# Patient Record
Sex: FEMALE | Race: OTHER | NOT HISPANIC OR LATINO
[De-identification: names, ages, dates, MRNs, and addresses within clinical notes are randomized per-mention and may not be internally consistent; named-entity substitution may affect disease eponyms.]

---

## 2017-07-07 ENCOUNTER — APPOINTMENT (OUTPATIENT)
Dept: BREAST CENTER | Facility: CLINIC | Age: 37
End: 2017-07-07
Payer: COMMERCIAL

## 2017-07-07 VITALS
HEART RATE: 61 BPM | HEIGHT: 62 IN | RESPIRATION RATE: 16 BRPM | TEMPERATURE: 97.5 F | BODY MASS INDEX: 23 KG/M2 | SYSTOLIC BLOOD PRESSURE: 125 MMHG | WEIGHT: 125 LBS | DIASTOLIC BLOOD PRESSURE: 78 MMHG

## 2017-07-07 DIAGNOSIS — F15.90 OTHER STIMULANT USE, UNSPECIFIED, UNCOMPLICATED: ICD-10-CM

## 2017-07-07 PROBLEM — Z00.00 ENCOUNTER FOR PREVENTIVE HEALTH EXAMINATION: Status: ACTIVE | Noted: 2017-07-07

## 2017-07-07 PROCEDURE — 99205 OFFICE O/P NEW HI 60 MIN: CPT

## 2017-07-10 ENCOUNTER — APPOINTMENT (OUTPATIENT)
Dept: PLASTIC SURGERY | Facility: CLINIC | Age: 37
End: 2017-07-10
Payer: COMMERCIAL

## 2017-07-10 DIAGNOSIS — M17.10 UNILATERAL PRIMARY OSTEOARTHRITIS, UNSPECIFIED KNEE: ICD-10-CM

## 2017-07-10 PROCEDURE — 99205 OFFICE O/P NEW HI 60 MIN: CPT

## 2017-07-13 ENCOUNTER — FORM ENCOUNTER (OUTPATIENT)
Age: 37
End: 2017-07-13

## 2017-07-13 DIAGNOSIS — R92.8 OTHER ABNORMAL AND INCONCLUSIVE FINDINGS ON DIAGNOSTIC IMAGING OF BREAST: ICD-10-CM

## 2017-07-14 ENCOUNTER — OUTPATIENT (OUTPATIENT)
Dept: OUTPATIENT SERVICES | Facility: HOSPITAL | Age: 37
LOS: 1 days | End: 2017-07-14
Payer: COMMERCIAL

## 2017-07-14 PROCEDURE — 77059 MRI BREAST BILATERAL: CPT | Mod: 26

## 2017-07-14 PROCEDURE — 0159T: CPT | Mod: 26

## 2017-07-17 PROBLEM — R92.8 ABNORMAL FINDING ON RADIOLOGICAL EXAMINATION OF BREAST: Status: ACTIVE | Noted: 2017-07-17

## 2017-07-18 ENCOUNTER — RESULT REVIEW (OUTPATIENT)
Age: 37
End: 2017-07-18

## 2017-07-18 PROCEDURE — C8937: CPT

## 2017-07-18 PROCEDURE — 88321 CONSLTJ&REPRT SLD PREP ELSWR: CPT

## 2017-07-18 PROCEDURE — A9585: CPT

## 2017-07-18 PROCEDURE — 77049 MRI BREAST C-+ W/CAD BI: CPT

## 2017-07-19 LAB — SURGICAL PATHOLOGY STUDY: SIGNIFICANT CHANGE UP

## 2017-07-23 ENCOUNTER — FORM ENCOUNTER (OUTPATIENT)
Age: 37
End: 2017-07-23

## 2017-07-24 ENCOUNTER — OUTPATIENT (OUTPATIENT)
Dept: OUTPATIENT SERVICES | Facility: HOSPITAL | Age: 37
LOS: 1 days | End: 2017-07-24
Payer: COMMERCIAL

## 2017-07-24 ENCOUNTER — FORM ENCOUNTER (OUTPATIENT)
Age: 37
End: 2017-07-24

## 2017-07-24 PROCEDURE — 73706 CT ANGIO LWR EXTR W/O&W/DYE: CPT

## 2017-07-24 PROCEDURE — 75635 CT ANGIO ABDOMINAL ARTERIES: CPT | Mod: 26

## 2017-07-24 PROCEDURE — 74174 CTA ABD&PLVS W/CONTRAST: CPT

## 2017-07-25 ENCOUNTER — RESULT REVIEW (OUTPATIENT)
Age: 37
End: 2017-07-25

## 2017-07-25 ENCOUNTER — OUTPATIENT (OUTPATIENT)
Dept: OUTPATIENT SERVICES | Facility: HOSPITAL | Age: 37
LOS: 1 days | End: 2017-07-25
Payer: COMMERCIAL

## 2017-07-25 PROCEDURE — 76642 ULTRASOUND BREAST LIMITED: CPT | Mod: 26,RT

## 2017-07-25 PROCEDURE — 76642 ULTRASOUND BREAST LIMITED: CPT

## 2017-07-27 ENCOUNTER — FORM ENCOUNTER (OUTPATIENT)
Age: 37
End: 2017-07-27

## 2017-07-28 ENCOUNTER — OUTPATIENT (OUTPATIENT)
Dept: OUTPATIENT SERVICES | Facility: HOSPITAL | Age: 37
LOS: 1 days | End: 2017-07-28
Payer: COMMERCIAL

## 2017-07-28 ENCOUNTER — RESULT REVIEW (OUTPATIENT)
Age: 37
End: 2017-07-28

## 2017-07-28 PROCEDURE — 77065 DX MAMMO INCL CAD UNI: CPT

## 2017-07-28 PROCEDURE — A4648: CPT

## 2017-07-28 PROCEDURE — 19085 BX BREAST 1ST LESION MR IMAG: CPT | Mod: RT

## 2017-07-28 PROCEDURE — G0206: CPT | Mod: 26,RT

## 2017-07-28 PROCEDURE — A9585: CPT

## 2017-07-28 PROCEDURE — 19085 BX BREAST 1ST LESION MR IMAG: CPT

## 2017-07-28 PROCEDURE — 88305 TISSUE EXAM BY PATHOLOGIST: CPT

## 2017-07-31 ENCOUNTER — FORM ENCOUNTER (OUTPATIENT)
Age: 37
End: 2017-07-31

## 2017-07-31 LAB — SURGICAL PATHOLOGY STUDY: SIGNIFICANT CHANGE UP

## 2017-08-01 ENCOUNTER — OUTPATIENT (OUTPATIENT)
Dept: OUTPATIENT SERVICES | Facility: HOSPITAL | Age: 37
LOS: 1 days | End: 2017-08-01
Payer: COMMERCIAL

## 2017-08-01 ENCOUNTER — OTHER (OUTPATIENT)
Age: 37
End: 2017-08-01

## 2017-08-01 ENCOUNTER — RESULT REVIEW (OUTPATIENT)
Age: 37
End: 2017-08-01

## 2017-08-01 VITALS
WEIGHT: 119.71 LBS | SYSTOLIC BLOOD PRESSURE: 107 MMHG | DIASTOLIC BLOOD PRESSURE: 69 MMHG | RESPIRATION RATE: 16 BRPM | TEMPERATURE: 98 F | OXYGEN SATURATION: 100 % | HEART RATE: 76 BPM | HEIGHT: 62 IN

## 2017-08-01 PROCEDURE — 71020: CPT | Mod: 26

## 2017-08-01 PROCEDURE — 78195 LYMPH SYSTEM IMAGING: CPT | Mod: 26

## 2017-08-01 NOTE — PRE-OP CHECKLIST - BSA (M2)
SUBJECTIVE:      Mallory Sargent is a 49 year old female who presents to clinic today for the following health issues:        Patient is here for a referral for a sleep study and would like a referral for genetic testing   Wakes up from snoring , feels tired during the day patient suspect that she has sleep apnea      Sleep Study Referral Request:  She has snored for most of her adult life. She estimates at least 12-15 years.    She wakes herself up at night at the end of a snore.   Unsure if she stops breathing during the night.   Tired all the time during the day. This is her big motivating factor for the sleep study.      Genetic Testing Referral Request:  Mom  of ovarian cancer.   - 57 at diagnosis  - 62 when passed.   Mother's aunt also had ovarian cancer.   No family history of breast cancer.   Would like to get tested for BRCA1/2.     Mother's first cousin had colon cancer.   She goes in every five years for a colonoscopy.    No family history of other cancers, including pancreatic, melanoma, or endometrial cancer.             Problem list and histories reviewed & adjusted, as indicated.  Additional history: as documented     Patient Active Problem List   Diagnosis     Family history of malignant neoplasm of ovary     Benign shuddering attack     Esophageal reflux     Acute reaction to stress     RECURR Depressive disorder - MOD     Hyperlipidemia LDL goal <130     Obesity     Papanicolaou smear of cervix with atypical squamous cells of undetermined significance (ASC-US)     Past Surgical History:   Procedure Laterality Date     C LIGATE FALLOPIAN TUBE       C NONSPECIFIC PROCEDURE      vaginal repair after delivery     C/SECTION, LOW TRANSVERSE      , Low Transverse     CHOLECYSTECTOMY, LAPOROSCOPIC  2010    Cholecystectomy, Laparoscopic     COLONOSCOPY  2/15/2016    Dr. Marc SILVA     HERNIA REPAIR, INCISIONAL  2010    Laparoscopic       Social History   Substance Use Topics      "Smoking status: Former Smoker     Quit date: 5/30/1993     Smokeless tobacco: Never Used     Alcohol use Yes      Comment: very rarely      Family History   Problem Relation Age of Onset     Hypertension Father      Lipids Father      GASTROINTESTINAL DISEASE Father      vazquez's esophagus fr. reflux      CANCER Mother      ovarian     Arthritis Mother      lupus      Depression Paternal Grandmother      problems with schizophrenia     Arthritis Brother      GASTROINTESTINAL DISEASE Brother      reflux      Colon Cancer Cousin      Ovarian Cancer Other      Maternal great aunt         Current Outpatient Prescriptions   Medication Sig Dispense Refill     omeprazole (PRILOSEC) 20 MG CR capsule TAKE 1 CAPSULE BY MOUTH DAILY, 30 TO 60 MINUTES BEFORE A MEAL. 90 capsule 3     simvastatin (ZOCOR) 40 MG tablet Take 1 tablet (40 mg) by mouth At Bedtime 90 tablet 1     multivitamin, therapeutic with minerals (MULTI-VITAMIN) TABS tablet Take 1 tablet by mouth daily       traZODone (DESYREL) 50 MG tablet Take 1 tablet (50 mg) by mouth nightly as needed for sleep 30 tablet 1     zolpidem (AMBIEN) 5 MG tablet Take 1 tablet (5 mg) by mouth nightly as needed for sleep 30 tablet 5     aspirin 81 MG tablet Take by mouth daily 30 tablet      No Known Allergies     Reviewed and updated as needed this visit by clinical staff        Reviewed and updated as needed this visit by Provider           ROS:  C: NEGATIVE for fever, chills, change in weight  INTEGUMENTARY/SKIN: NEGATIVE for worrisome rashes, moles or lesions    OBJECTIVE:     /66  Pulse 71  Temp 98.5  F (36.9  C) (Oral)  Ht 5' 10.5\" (1.791 m)  Wt 228 lb (103.4 kg)  LMP 06/09/2015  SpO2 98%  BMI 32.25 kg/m2  Body mass index is 32.25 kg/(m^2).  GENERAL: healthy, alert and no distress    Diagnostic Test Results:  none     ASSESSMENT/PLAN:   1. Snoring  Patient has a history of snoring and daytime drowsiness. Referral sent for sleep evaluation to assess for sleep apnea. "     - SLEEP EVALUATION & MANAGEMENT REFERRAL - ADULT; Future    2. Family history of malignant neoplasm of ovary  Maternal family history of ovarian cancer. She is concerned about her risk for cancer. Referral sent for genetic counseling and cancer risk management to assess for genetic risk of ovarian cancer.     - CANCER RISK MGMT/CANCER GENETIC COUNSELING REFERRAL      Fawad MUNOZ     1.54

## 2017-08-01 NOTE — PATIENT PROFILE ADULT. - TEACHING/LEARNING LEARNING PREFERENCES
verbal instruction/written material/individual instruction skill demonstration/verbal instruction/written material/individual instruction

## 2017-08-02 ENCOUNTER — INPATIENT (INPATIENT)
Facility: HOSPITAL | Age: 37
LOS: 2 days | Discharge: HOME HEALTH | DRG: 572 | End: 2017-08-05
Attending: PLASTIC SURGERY | Admitting: PLASTIC SURGERY
Payer: COMMERCIAL

## 2017-08-02 ENCOUNTER — RESULT REVIEW (OUTPATIENT)
Age: 37
End: 2017-08-02

## 2017-08-02 DIAGNOSIS — R19.8 OTHER SPECIFIED SYMPTOMS AND SIGNS INVOLVING THE DIGESTIVE SYSTEM AND ABDOMEN: Chronic | ICD-10-CM

## 2017-08-02 LAB
BASE EXCESS BLDA CALC-SCNC: -3.6 MMOL/L — LOW (ref -2–3)
CA-I BLDA-SCNC: 1.07 MMOL/L — LOW (ref 1.12–1.3)
COHGB MFR BLDA: 0.3 % — SIGNIFICANT CHANGE UP
GAS PNL BLDA: SIGNIFICANT CHANGE UP
HCO3 BLDA-SCNC: 20 MMOL/L — LOW (ref 21–28)
HGB BLDA-MCNC: 12 G/DL — SIGNIFICANT CHANGE UP (ref 11.5–15.5)
METHGB MFR BLDA: 0.3 % — SIGNIFICANT CHANGE UP
O2 CT VFR BLDA CALC: SIGNIFICANT CHANGE UP (ref 15–23)
OXYHGB MFR BLDA: 99 % — SIGNIFICANT CHANGE UP (ref 94–100)
PCO2 BLDA: 34 MMHG — SIGNIFICANT CHANGE UP (ref 32–45)
PH BLDA: 7.4 — SIGNIFICANT CHANGE UP (ref 7.35–7.45)
PO2 BLDA: 311 MMHG — HIGH (ref 83–108)
POTASSIUM BLDA-SCNC: 3.5 MMOL/L — SIGNIFICANT CHANGE UP (ref 3.5–4.9)
SAO2 % BLDA: 100 % — SIGNIFICANT CHANGE UP (ref 95–100)
SODIUM BLDA-SCNC: 135 MMOL/L — LOW (ref 138–146)

## 2017-08-02 PROCEDURE — 78195 LYMPH SYSTEM IMAGING: CPT

## 2017-08-02 PROCEDURE — S2066: CPT | Mod: LT

## 2017-08-02 PROCEDURE — 38525 BIOPSY/REMOVAL LYMPH NODES: CPT | Mod: LT

## 2017-08-02 PROCEDURE — 38530 BIOPSY/REMOVAL LYMPH NODES: CPT | Mod: 50,59

## 2017-08-02 PROCEDURE — S2066: CPT | Mod: 80,RT

## 2017-08-02 PROCEDURE — 71046 X-RAY EXAM CHEST 2 VIEWS: CPT

## 2017-08-02 PROCEDURE — 19303 MAST SIMPLE COMPLETE: CPT | Mod: 50

## 2017-08-02 PROCEDURE — 38900 IO MAP OF SENT LYMPH NODE: CPT | Mod: LT

## 2017-08-02 PROCEDURE — A9541: CPT

## 2017-08-02 RX ORDER — DOCUSATE SODIUM 100 MG
100 CAPSULE ORAL THREE TIMES A DAY
Qty: 0 | Refills: 0 | Status: DISCONTINUED | OUTPATIENT
Start: 2017-08-02 | End: 2017-08-05

## 2017-08-02 RX ORDER — SODIUM CHLORIDE 9 MG/ML
1000 INJECTION, SOLUTION INTRAVENOUS
Qty: 0 | Refills: 0 | Status: DISCONTINUED | OUTPATIENT
Start: 2017-08-02 | End: 2017-08-03

## 2017-08-02 RX ORDER — ENOXAPARIN SODIUM 100 MG/ML
40 INJECTION SUBCUTANEOUS EVERY 24 HOURS
Qty: 0 | Refills: 0 | Status: DISCONTINUED | OUTPATIENT
Start: 2017-08-03 | End: 2017-08-05

## 2017-08-02 RX ORDER — BUPIVACAINE 13.3 MG/ML
20 INJECTION, SUSPENSION, LIPOSOMAL INFILTRATION ONCE
Qty: 0 | Refills: 0 | Status: DISCONTINUED | OUTPATIENT
Start: 2017-08-02 | End: 2017-08-05

## 2017-08-02 RX ORDER — KETOROLAC TROMETHAMINE 30 MG/ML
30 SYRINGE (ML) INJECTION EVERY 6 HOURS
Qty: 0 | Refills: 0 | Status: COMPLETED | OUTPATIENT
Start: 2017-08-02 | End: 2017-08-05

## 2017-08-02 RX ORDER — METOCLOPRAMIDE HCL 10 MG
10 TABLET ORAL EVERY 6 HOURS
Qty: 0 | Refills: 0 | Status: DISCONTINUED | OUTPATIENT
Start: 2017-08-02 | End: 2017-08-05

## 2017-08-02 RX ORDER — ACETAMINOPHEN 500 MG
975 TABLET ORAL EVERY 8 HOURS
Qty: 0 | Refills: 0 | Status: DISCONTINUED | OUTPATIENT
Start: 2017-08-02 | End: 2017-08-05

## 2017-08-02 RX ORDER — OXYCODONE HYDROCHLORIDE 5 MG/1
10 TABLET ORAL EVERY 4 HOURS
Qty: 0 | Refills: 0 | Status: DISCONTINUED | OUTPATIENT
Start: 2017-08-02 | End: 2017-08-05

## 2017-08-02 RX ORDER — OXYCODONE HYDROCHLORIDE 5 MG/1
5 TABLET ORAL EVERY 4 HOURS
Qty: 0 | Refills: 0 | Status: DISCONTINUED | OUTPATIENT
Start: 2017-08-02 | End: 2017-08-05

## 2017-08-02 RX ORDER — SENNA PLUS 8.6 MG/1
2 TABLET ORAL AT BEDTIME
Qty: 0 | Refills: 0 | Status: DISCONTINUED | OUTPATIENT
Start: 2017-08-02 | End: 2017-08-05

## 2017-08-02 RX ORDER — ENOXAPARIN SODIUM 100 MG/ML
40 INJECTION SUBCUTANEOUS ONCE
Qty: 0 | Refills: 0 | Status: COMPLETED | OUTPATIENT
Start: 2017-08-02 | End: 2017-08-02

## 2017-08-02 RX ORDER — HYDROMORPHONE HYDROCHLORIDE 2 MG/ML
0.5 INJECTION INTRAMUSCULAR; INTRAVENOUS; SUBCUTANEOUS
Qty: 0 | Refills: 0 | Status: DISCONTINUED | OUTPATIENT
Start: 2017-08-02 | End: 2017-08-05

## 2017-08-02 RX ORDER — ONDANSETRON 8 MG/1
4 TABLET, FILM COATED ORAL EVERY 6 HOURS
Qty: 0 | Refills: 0 | Status: DISCONTINUED | OUTPATIENT
Start: 2017-08-02 | End: 2017-08-05

## 2017-08-02 RX ORDER — CEFAZOLIN SODIUM 1 G
2000 VIAL (EA) INJECTION EVERY 8 HOURS
Qty: 0 | Refills: 0 | Status: COMPLETED | OUTPATIENT
Start: 2017-08-02 | End: 2017-08-03

## 2017-08-02 RX ADMIN — HYDROMORPHONE HYDROCHLORIDE 0.5 MILLIGRAM(S): 2 INJECTION INTRAMUSCULAR; INTRAVENOUS; SUBCUTANEOUS at 21:14

## 2017-08-02 RX ADMIN — Medication 10 MILLIGRAM(S): at 23:40

## 2017-08-02 RX ADMIN — ENOXAPARIN SODIUM 40 MILLIGRAM(S): 100 INJECTION SUBCUTANEOUS at 07:46

## 2017-08-02 RX ADMIN — ONDANSETRON 4 MILLIGRAM(S): 8 TABLET, FILM COATED ORAL at 20:45

## 2017-08-02 RX ADMIN — HYDROMORPHONE HYDROCHLORIDE 0.5 MILLIGRAM(S): 2 INJECTION INTRAMUSCULAR; INTRAVENOUS; SUBCUTANEOUS at 20:45

## 2017-08-03 ENCOUNTER — TRANSCRIPTION ENCOUNTER (OUTPATIENT)
Age: 37
End: 2017-08-03

## 2017-08-03 LAB
ANION GAP SERPL CALC-SCNC: 12 MMOL/L — SIGNIFICANT CHANGE UP (ref 5–17)
BUN SERPL-MCNC: 9 MG/DL — SIGNIFICANT CHANGE UP (ref 7–23)
CALCIUM SERPL-MCNC: 8.2 MG/DL — LOW (ref 8.4–10.5)
CHLORIDE SERPL-SCNC: 103 MMOL/L — SIGNIFICANT CHANGE UP (ref 96–108)
CO2 SERPL-SCNC: 22 MMOL/L — SIGNIFICANT CHANGE UP (ref 22–31)
CREAT SERPL-MCNC: 0.5 MG/DL — SIGNIFICANT CHANGE UP (ref 0.5–1.3)
GLUCOSE SERPL-MCNC: 131 MG/DL — HIGH (ref 70–99)
HCT VFR BLD CALC: 33.3 % — LOW (ref 34.5–45)
HGB BLD-MCNC: 10.9 G/DL — LOW (ref 11.5–15.5)
MCHC RBC-ENTMCNC: 30.8 PG — SIGNIFICANT CHANGE UP (ref 27–34)
MCHC RBC-ENTMCNC: 32.7 G/DL — SIGNIFICANT CHANGE UP (ref 32–36)
MCV RBC AUTO: 94.1 FL — SIGNIFICANT CHANGE UP (ref 80–100)
PLATELET # BLD AUTO: 247 K/UL — SIGNIFICANT CHANGE UP (ref 150–400)
POTASSIUM SERPL-MCNC: 4.4 MMOL/L — SIGNIFICANT CHANGE UP (ref 3.5–5.3)
POTASSIUM SERPL-SCNC: 4.4 MMOL/L — SIGNIFICANT CHANGE UP (ref 3.5–5.3)
RBC # BLD: 3.54 M/UL — LOW (ref 3.8–5.2)
RBC # FLD: 11.9 % — SIGNIFICANT CHANGE UP (ref 10.3–16.9)
SODIUM SERPL-SCNC: 137 MMOL/L — SIGNIFICANT CHANGE UP (ref 135–145)
WBC # BLD: 16.4 K/UL — HIGH (ref 3.8–10.5)
WBC # FLD AUTO: 16.4 K/UL — HIGH (ref 3.8–10.5)

## 2017-08-03 RX ORDER — SODIUM CHLORIDE 9 MG/ML
1000 INJECTION, SOLUTION INTRAVENOUS
Qty: 0 | Refills: 0 | Status: DISCONTINUED | OUTPATIENT
Start: 2017-08-03 | End: 2017-08-05

## 2017-08-03 RX ADMIN — Medication 975 MILLIGRAM(S): at 07:46

## 2017-08-03 RX ADMIN — Medication 30 MILLIGRAM(S): at 17:04

## 2017-08-03 RX ADMIN — ONDANSETRON 4 MILLIGRAM(S): 8 TABLET, FILM COATED ORAL at 10:25

## 2017-08-03 RX ADMIN — Medication 30 MILLIGRAM(S): at 13:31

## 2017-08-03 RX ADMIN — Medication 100 MILLIGRAM(S): at 09:26

## 2017-08-03 RX ADMIN — Medication 100 MILLIGRAM(S): at 06:08

## 2017-08-03 RX ADMIN — Medication 100 MILLIGRAM(S): at 21:46

## 2017-08-03 RX ADMIN — Medication 30 MILLIGRAM(S): at 01:34

## 2017-08-03 RX ADMIN — OXYCODONE HYDROCHLORIDE 5 MILLIGRAM(S): 5 TABLET ORAL at 10:10

## 2017-08-03 RX ADMIN — OXYCODONE HYDROCHLORIDE 5 MILLIGRAM(S): 5 TABLET ORAL at 17:05

## 2017-08-03 RX ADMIN — Medication 975 MILLIGRAM(S): at 22:46

## 2017-08-03 RX ADMIN — Medication 30 MILLIGRAM(S): at 17:35

## 2017-08-03 RX ADMIN — Medication 975 MILLIGRAM(S): at 14:27

## 2017-08-03 RX ADMIN — Medication 30 MILLIGRAM(S): at 07:46

## 2017-08-03 RX ADMIN — OXYCODONE HYDROCHLORIDE 5 MILLIGRAM(S): 5 TABLET ORAL at 17:50

## 2017-08-03 RX ADMIN — OXYCODONE HYDROCHLORIDE 10 MILLIGRAM(S): 5 TABLET ORAL at 20:56

## 2017-08-03 RX ADMIN — Medication 975 MILLIGRAM(S): at 15:05

## 2017-08-03 RX ADMIN — SODIUM CHLORIDE 100 MILLILITER(S): 9 INJECTION, SOLUTION INTRAVENOUS at 09:00

## 2017-08-03 RX ADMIN — Medication 100 MILLIGRAM(S): at 01:30

## 2017-08-03 RX ADMIN — Medication 30 MILLIGRAM(S): at 12:49

## 2017-08-03 RX ADMIN — Medication 975 MILLIGRAM(S): at 06:08

## 2017-08-03 RX ADMIN — Medication 30 MILLIGRAM(S): at 00:47

## 2017-08-03 RX ADMIN — Medication 30 MILLIGRAM(S): at 06:07

## 2017-08-03 RX ADMIN — OXYCODONE HYDROCHLORIDE 5 MILLIGRAM(S): 5 TABLET ORAL at 09:26

## 2017-08-03 RX ADMIN — Medication 100 MILLIGRAM(S): at 14:27

## 2017-08-03 RX ADMIN — ENOXAPARIN SODIUM 40 MILLIGRAM(S): 100 INJECTION SUBCUTANEOUS at 12:49

## 2017-08-03 RX ADMIN — Medication 975 MILLIGRAM(S): at 21:46

## 2017-08-03 NOTE — DISCHARGE NOTE ADULT - PATIENT PORTAL LINK FT
“You can access the Follow[employer] Patient Portal, offered by [facility], by registering with the following website: [email or web address]”

## 2017-08-03 NOTE — PHYSICAL THERAPY INITIAL EVALUATION ADULT - ACTIVE RANGE OF MOTION EXAMINATION, REHAB EVAL
B UE within functional limits for mobility; shoulder motion not assessed above ~70 degrees of shoulder flexion. Hands/wrist/elbows WNL. Bilateral LE motion within functional limits: knee/ankle within normal limits, hips not flexed past 45 degrees as per surgical precautions.

## 2017-08-03 NOTE — DISCHARGE NOTE ADULT - ADDITIONAL INSTRUCTIONS
-Call Doctor’s office or return to ER if: fever (temperature >101.4F), chills, chest pain, shortness of breath, uncontrolled/severe pain, persistent nausea/vomiting, or bleeding/oozing/redness/swelling at incision sites.  	  -For routine questions, call the office ([phone]) weekdays 9:00 A.M. - 5:00 P.M.  For emergencies after business hours, call any time using this same office phone number and the answering service will put you in touch with Dr. Lerman.

## 2017-08-03 NOTE — DISCHARGE NOTE ADULT - CARE PLAN
Principal Discharge DX:	Breast tumor  Goal:	post op recovery and follow up  Instructions for follow-up, activity and diet:	*Please refer to the post-operative care instruction provided from Dr. Lerman’s office.    -Follow up with Plastic & Reconstructive Surgeon, Dr. Lerman in 1 week in the office.   -Follow up with Breast Surgeon, Dr. Ricketts in 1-2 weeks in the office.    -Continue AMADO drain care as instructed. (Empty and record the AMADO drainage twice daily after discharge. Also, strip/milk the drain tubing each time to minimize clogging. Bring the recorded drain amounts to the office so that it can be reviewed by the physician.)     -Take Aspirin 325mg once daily for 10days.   -Take Percocet & Valium as prescribed for pain control.     -Diet: no restrictions.     -Showers are permitted the day of discharge. The drains and the incision lines can get wet in the shower. Do not take a bath. Pin the drains to a bathrobe belt or string or a small towel draped over your neck in order that the drains do not dangle from your skin while in the shower. Keep incision sites and AMADO drain sites clean & dry after showering.     -No heavy lifting >20 pounds or strenuous exercises.

## 2017-08-03 NOTE — DISCHARGE NOTE ADULT - INSTRUCTIONS
The patient may resume a regular diet. Report any fever, swelling, or pain,shortness of breath, or any unusual symptoms.

## 2017-08-03 NOTE — DISCHARGE NOTE ADULT - PLAN OF CARE
post op recovery and follow up *Please refer to the post-operative care instruction provided from Dr. Lerman’s office.    -Follow up with Plastic & Reconstructive Surgeon, Dr. Lerman in 1 week in the office.   -Follow up with Breast Surgeon, Dr. Ricketts in 1-2 weeks in the office.    -Continue AMADO drain care as instructed. (Empty and record the AMADO drainage twice daily after discharge. Also, strip/milk the drain tubing each time to minimize clogging. Bring the recorded drain amounts to the office so that it can be reviewed by the physician.)     -Take Aspirin 325mg once daily for 10days.   -Take Percocet & Valium as prescribed for pain control.     -Diet: no restrictions.     -Showers are permitted the day of discharge. The drains and the incision lines can get wet in the shower. Do not take a bath. Pin the drains to a bathrobe belt or string or a small towel draped over your neck in order that the drains do not dangle from your skin while in the shower. Keep incision sites and AMADO drain sites clean & dry after showering.     -No heavy lifting >20 pounds or strenuous exercises.

## 2017-08-03 NOTE — DISCHARGE NOTE ADULT - MEDICATION SUMMARY - MEDICATIONS TO TAKE
I will START or STAY ON the medications listed below when I get home from the hospital:    Percocet 5/325 325 mg-5 mg oral tablet  -- 1-2 tab(s) by mouth every 4 hours, As Needed -for moderate pain MDD:10  -- Caution federal law prohibits the transfer of this drug to any person other  than the person for whom it was prescribed.  May cause drowsiness.  Alcohol may intensify this effect.  Use care when operating dangerous machinery.  This prescription cannot be refilled.  This product contains acetaminophen.  Do not use  with any other product containing acetaminophen to prevent possible liver damage.  Using more of this medication than prescribed may cause serious breathing problems.    -- Indication: For Postoperative Pain    aspirin 325 mg oral tablet  -- 1 tab(s) by mouth once a day  -- Take with food or milk.    -- Indication: For Microvascular Patency    docusate sodium 100 mg oral capsule  -- 1 cap(s) by mouth 3 times a day  -- Indication: For Constipation    senna oral tablet  -- 2 tab(s) by mouth once a day (at bedtime), As needed, Constipation  -- Indication: For Constipation

## 2017-08-03 NOTE — DISCHARGE NOTE ADULT - CARE PROVIDER_API CALL
Lerman, Oren Z (MD), Plastic Surgery  [address] [place] [address]  Phone: [phone]  Fax: [phone]    Divya Ricketts (MD), Surgery; Surgical Critical Care  [address] [place] [address]  Phone: [phone]  Fax: [phone]

## 2017-08-03 NOTE — PROGRESS NOTE ADULT - SUBJECTIVE AND OBJECTIVE BOX
08-03-17 @ 08:18            [facility] [id number]    T(C): 36.7 (08-03-17 @ 05:43), Max: 37.1 (08-02-17 @ 23:15)  HR: 70 (08-03-17 @ 05:15) (62 - 78)  BP: 114/58 (08-03-17 @ 05:15) (99/69 - 133/74)  RR: 16 (08-03-17 @ 05:15) (15 - 17)  SpO2: 100% (08-03-17 @ 05:15) (96% - 100%)    08-02-17 @ 07:01  -  08-03-17 @ 07:00  --------------------------------------------------------  IN:    lactated ringers.: 375 mL  Total IN: 375 mL    OUT:    Drain: 40.5 mL    Drain: 50 mL    Drain: 17.5 mL    Drain: 55 mL    Indwelling Catheter - Urethral: 620 mL  Total OUT: 783 mL    Total NET: -408 mL        10.9<L> [11.5 - 15.5]  33.3<L> [34.5 - 45.0]  94.1 [80.0 - 100.0]  247 [150 - 400]  16.4<H> [3.8 - 10.5]  --  --  --  --  --      POD1 ZOHAIB MASTECTOMY + PAP free flep REC.     feels well report maximal pain of 6 on a 1-10 scale. denies nausea, dyspnea, fever.    ***Physical Exam***  General: WN/WD NAD  Neurology: A&Ox3, nonfocal, MASON x 4  Respiratory: CTA B/L  Breast: skin flaps intact w/o evidence of ischemia/necrosis  Abdominal: Soft, NT, ND +BS, Last BM  Extremities: No edema, + peripheral pulses, Thigh scars are closed w/o evidence of Infection  : Bazzi catheter in place  Flap [place] ZOHAIB: Pink, soft, No evidence of congestion/ Ischemia/ necrosis/ Dehiscence.  Capillary refill good 2-3sec, Cook Doppler signal strong ZOHAIB

## 2017-08-03 NOTE — DISCHARGE NOTE ADULT - CARE PROVIDERS DIRECT ADDRESSES
[email or web address] - Parking [email or web address] [email or web address] Hospital.

## 2017-08-03 NOTE — PHYSICAL THERAPY INITIAL EVALUATION ADULT - PRECAUTIONS/LIMITATIONS, REHAB EVAL
** HIPS flexed NO MORE than 45 degrees as per PT order and confirmed with Latricia (PA, plastics team).

## 2017-08-03 NOTE — DISCHARGE NOTE ADULT - HOSPITAL COURSE
37 yo F underwent elective bilateral mastectomy and immediate breast reconstruction with PAP flaps. Patient tolerated the procedure well and had uneventful postoperative hospital course.  On the day of the discharge, patient was evaluated and deemed in stable condition to be discharged to home. Follow up in one week with Dr. Lerman and Dr. Ricketts in the office.

## 2017-08-03 NOTE — PHYSICAL THERAPY INITIAL EVALUATION ADULT - ADDITIONAL COMMENTS
Patient states that she lives in an elevator building with her . Fully independent prior to admission.

## 2017-08-03 NOTE — PHYSICAL THERAPY INITIAL EVALUATION ADULT - GENERAL OBSERVATIONS, REHAB EVAL
Patient received supine in bed with + AMADO drain x 2 (2 at chest, 2 at proximal thighs), surgical bra in place, off tele,  (Giovanni) at bedside. Reports pain 6/10 in left hip region.

## 2017-08-03 NOTE — CHART NOTE - NSCHARTNOTEFT_GEN_A_CORE
Patient seen and examined.    Both flaps viable with 2-3 second capillary refills and (+) arterial Cook Doppler signals. No congestion appreciated. No collections appreciated.     Bilateral thigh incisions intact without collections.     Continue current management.   Diet advanced to regular.

## 2017-08-03 NOTE — PROGRESS NOTE ADULT - SUBJECTIVE AND OBJECTIVE BOX
CLAUDIA MANCUSO   [id number]  36y     T(C): 37.8 (08-03-17 @ 17:44), Max: 37.8 (08-03-17 @ 17:44)  HR: 71 (08-03-17 @ 13:00) (62 - 78)  BP: 110/56 (08-03-17 @ 13:00) (98/55 - 133/74)  RR: 18 (08-03-17 @ 13:00) (15 - 18)  SpO2: 100% (08-03-17 @ 13:00) (96% - 100%)  Wt(kg): --      08-02 @ 07:01  -  08-03 @ 07:00  --------------------------------------------------------  IN: 375 mL / OUT: 783 mL / NET: -408 mL    08-03 @ 07:01  -  08-03 @ 18:14  --------------------------------------------------------  IN: 650 mL / OUT: 1775 mL / NET: -1125 mL        BUpivacaine liposome 1.3% Injectable (no eMAR) 20 milliLiter(s) Local Injection once  enoxaparin Injectable 40 milliGRAM(s) SubCutaneous every 24 hours  ketorolac   Injectable 30 milliGRAM(s) IV Push every 6 hours  HYDROmorphone  Injectable 0.5 milliGRAM(s) IV Push every 3 hours PRN  ondansetron Injectable 4 milliGRAM(s) IV Push every 6 hours PRN  metoclopramide Injectable 10 milliGRAM(s) IV Push every 6 hours PRN  docusate sodium 100 milliGRAM(s) Oral three times a day  senna 2 Tablet(s) Oral at bedtime PRN  oxyCODONE    IR 5 milliGRAM(s) Oral every 4 hours PRN  oxyCODONE    IR 10 milliGRAM(s) Oral every 4 hours PRN  acetaminophen   Tablet. 975 milliGRAM(s) Oral every 8 hours  dextrose 5% + sodium chloride 0.45%. 1000 milliLiter(s) IV Continuous <Continuous>  diazepam    Tablet 5 milliGRAM(s) Oral every 8 hours PRN                            10.9   16.4  )-----------( 247      ( 03 Aug 2017 06:27 )             33.3     08-03    137  |  103  |  9   ----------------------------<  131<H>  4.4   |  22  |  0.50    Ca    8.2<L>      03 Aug 2017 06:24      Alert and Oriented. NAD. Donor site Incision CDI healing well, no collections or infection.  Breast mounds soft, flaps pink, excellent doppler signals. no collections

## 2017-08-03 NOTE — PROGRESS NOTE ADULT - SUBJECTIVE AND OBJECTIVE BOX
Breast Surgery Progress Note     Patient is a 36y old  Female who presents with a chief complaint of bilateral mastectomy and breast reconstruction with PAP (profunda artery ) flap (03 Aug 2017 12:42)      HPI:      PAST MEDICAL & SURGICAL HISTORY:  Breast tumor  Endoscopy finding      Physical Exam:    Vital Signs Last 24 Hrs  T(C): 37.6 (03 Aug 2017 14:00), Max: 37.6 (03 Aug 2017 14:00)  T(F): 99.7 (03 Aug 2017 14:00), Max: 99.7 (03 Aug 2017 14:00)  HR: 71 (03 Aug 2017 13:00) (62 - 78)  BP: 110/56 (03 Aug 2017 13:00) (98/55 - 133/74)  BP(mean): 75 (03 Aug 2017 13:00) (70 - 89)  RR: 18 (03 Aug 2017 13:00) (15 - 18)  SpO2: 100% (03 Aug 2017 13:00) (96% - 100%)  General appearance:      Breasts:warm well perfused no evidence of hematoma, flaps b/l, AMADO SS    Nodes: no hematoma    Labs:                          10.9   16.4  )-----------( 247      ( 03 Aug 2017 06:27 )             33.3             08-03    137  |  103  |  9   ----------------------------<  131<H>  4.4   |  22  |  0.50    Ca    8.2<L>      03 Aug 2017 06:24            Assessment and Plan:  POD 1 s/p bilateral nipple sparing mastectomy, left SLNBX, PAP flaps  mgmt per primary team (plastics)  f/u in office in 10 days for results and adjuvant planning

## 2017-08-04 RX ADMIN — Medication 30 MILLIGRAM(S): at 11:22

## 2017-08-04 RX ADMIN — Medication 30 MILLIGRAM(S): at 18:00

## 2017-08-04 RX ADMIN — Medication 975 MILLIGRAM(S): at 21:32

## 2017-08-04 RX ADMIN — Medication 975 MILLIGRAM(S): at 06:42

## 2017-08-04 RX ADMIN — Medication 100 MILLIGRAM(S): at 06:42

## 2017-08-04 RX ADMIN — Medication 975 MILLIGRAM(S): at 14:27

## 2017-08-04 RX ADMIN — Medication 30 MILLIGRAM(S): at 11:52

## 2017-08-04 RX ADMIN — Medication 975 MILLIGRAM(S): at 22:32

## 2017-08-04 RX ADMIN — Medication 100 MILLIGRAM(S): at 14:26

## 2017-08-04 RX ADMIN — Medication 30 MILLIGRAM(S): at 17:30

## 2017-08-04 RX ADMIN — Medication 30 MILLIGRAM(S): at 06:42

## 2017-08-04 RX ADMIN — OXYCODONE HYDROCHLORIDE 10 MILLIGRAM(S): 5 TABLET ORAL at 22:33

## 2017-08-04 RX ADMIN — Medication 30 MILLIGRAM(S): at 01:20

## 2017-08-04 RX ADMIN — Medication 100 MILLIGRAM(S): at 21:33

## 2017-08-04 RX ADMIN — Medication 975 MILLIGRAM(S): at 14:25

## 2017-08-04 RX ADMIN — ENOXAPARIN SODIUM 40 MILLIGRAM(S): 100 INJECTION SUBCUTANEOUS at 11:51

## 2017-08-04 RX ADMIN — OXYCODONE HYDROCHLORIDE 10 MILLIGRAM(S): 5 TABLET ORAL at 21:33

## 2017-08-04 RX ADMIN — Medication 30 MILLIGRAM(S): at 00:59

## 2017-08-04 NOTE — CHART NOTE - NSCHARTNOTEFT_GEN_A_CORE
Patient seen and examined.    Flaps soft and viable with 2-3 second capillary refill and (+) arterial Doppler signal. No congestion appreciated. No collections appreciated.     Continue current management with q4H flap checks.

## 2017-08-04 NOTE — PROGRESS NOTE ADULT - SUBJECTIVE AND OBJECTIVE BOX
SUBJECTIVE:  Doing well.   No overnight events.   OOB with PT yesterday.    OBJECTIVE:     ** VITAL SIGNS / I&O's **    T(C): 37.1 (08-04-17 @ 16:32), Max: 37.8 (08-04-17 @ 14:51)  T(F): 98.8 (08-04-17 @ 16:32), Max: 100 (08-04-17 @ 14:51)  HR: 97 (08-04-17 @ 16:32) (89 - 98)  BP: 123/79 (08-04-17 @ 16:32) (107/61 - 138/62)  RR: 16 (08-04-17 @ 16:32) (16 - 19)  SpO2: 99% (08-04-17 @ 16:32) (97% - 100%)      03 Aug 2017 07:01  -  04 Aug 2017 07:00  --------------------------------------------------------  IN:    dextrose 5% + sodium chloride 0.45%.: 600 mL    IV PiggyBack: 50 mL  Total IN: 650 mL    OUT:    Drain: 100 mL    Drain: 105 mL    Drain: 100 mL    Drain: 120 mL    Indwelling Catheter - Urethral: 450 mL    Voided: 2950 mL  Total OUT: 3825 mL    Total NET: -3175 mL      04 Aug 2017 07:01  -  04 Aug 2017 17:56  --------------------------------------------------------  IN:    dextrose 5% + sodium chloride 0.45%.: 800 mL    Oral Fluid: 420 mL  Total IN: 1220 mL    OUT:    Drain: 30 mL    Drain: 35 mL    Drain: 30 mL    Drain: 40 mL    Voided: 1750 mL  Total OUT: 1885 mL    Total NET: -665 mL          ** PHYSICAL EXAM **     -- CONSTITUTIONAL: AOx3. NAD.   -- RIGHT BREAST / FLAP: Mastectomy skin flap ecchymosis, minimal. No collections. Flap pink with 2-3 second capillary refill. (+) arterial Doppler signal. Drain(s) serosanguinous.  -- LEFT BREAST / FLAP: Mastectomy skin flap ecchymosis, minimal. No collections. Flap pink with 2-3 second capillary refill. (+) arterial Doppler signal. Drain(s) serosanguinous.  -- CARDIOVASCULAR: Regular rate and rhythm. S1, S2.  -- RESPIRATORY: Bilateral breath sounds.   -- EXTREMITIES: Soft. No collections. Incisions intact. Drains serosanguinous.

## 2017-08-04 NOTE — PROGRESS NOTE ADULT - ASSESSMENT
36F s/p bilateral simple mastectomies and bilateral breast reconstruction with PAP flaps  >> Patient doing well  >> q1H flap checks  >> Plan to transfer to [facility] today between 1500 and 1600  >> Upon transfer to [facility], may de-escalate flap checks to q4H  >> OOB with PT  >> DVT prophylaxis  >> Drain care

## 2017-08-05 VITALS
HEART RATE: 83 BPM | SYSTOLIC BLOOD PRESSURE: 125 MMHG | OXYGEN SATURATION: 98 % | RESPIRATION RATE: 18 BRPM | DIASTOLIC BLOOD PRESSURE: 82 MMHG | TEMPERATURE: 98 F

## 2017-08-05 PROCEDURE — 86850 RBC ANTIBODY SCREEN: CPT

## 2017-08-05 PROCEDURE — 86900 BLOOD TYPING SEROLOGIC ABO: CPT

## 2017-08-05 PROCEDURE — 84132 ASSAY OF SERUM POTASSIUM: CPT

## 2017-08-05 PROCEDURE — 88307 TISSUE EXAM BY PATHOLOGIST: CPT

## 2017-08-05 PROCEDURE — 88360 TUMOR IMMUNOHISTOCHEM/MANUAL: CPT

## 2017-08-05 PROCEDURE — 80048 BASIC METABOLIC PNL TOTAL CA: CPT

## 2017-08-05 PROCEDURE — 36415 COLL VENOUS BLD VENIPUNCTURE: CPT

## 2017-08-05 PROCEDURE — 84295 ASSAY OF SERUM SODIUM: CPT

## 2017-08-05 PROCEDURE — 88332 PATH CONSLTJ SURG EA ADD BLK: CPT

## 2017-08-05 PROCEDURE — 88331 PATH CONSLTJ SURG 1 BLK 1SPC: CPT

## 2017-08-05 PROCEDURE — 88305 TISSUE EXAM BY PATHOLOGIST: CPT

## 2017-08-05 PROCEDURE — 86901 BLOOD TYPING SEROLOGIC RH(D): CPT

## 2017-08-05 PROCEDURE — 97162 PT EVAL MOD COMPLEX 30 MIN: CPT

## 2017-08-05 PROCEDURE — 97116 GAIT TRAINING THERAPY: CPT

## 2017-08-05 PROCEDURE — 85018 HEMOGLOBIN: CPT

## 2017-08-05 PROCEDURE — 82330 ASSAY OF CALCIUM: CPT

## 2017-08-05 PROCEDURE — 85027 COMPLETE CBC AUTOMATED: CPT

## 2017-08-05 RX ORDER — ASPIRIN/CALCIUM CARB/MAGNESIUM 324 MG
1 TABLET ORAL
Qty: 10 | Refills: 0 | OUTPATIENT
Start: 2017-08-05 | End: 2017-08-15

## 2017-08-05 RX ORDER — DOCUSATE SODIUM 100 MG
1 CAPSULE ORAL
Qty: 0 | Refills: 0 | COMMUNITY
Start: 2017-08-05

## 2017-08-05 RX ORDER — SENNA PLUS 8.6 MG/1
2 TABLET ORAL
Qty: 0 | Refills: 0 | COMMUNITY
Start: 2017-08-05

## 2017-08-05 RX ADMIN — ENOXAPARIN SODIUM 40 MILLIGRAM(S): 100 INJECTION SUBCUTANEOUS at 13:58

## 2017-08-05 RX ADMIN — Medication 100 MILLIGRAM(S): at 06:03

## 2017-08-05 RX ADMIN — Medication 30 MILLIGRAM(S): at 00:30

## 2017-08-05 RX ADMIN — Medication 30 MILLIGRAM(S): at 06:03

## 2017-08-05 RX ADMIN — Medication 30 MILLIGRAM(S): at 13:58

## 2017-08-05 RX ADMIN — Medication 30 MILLIGRAM(S): at 00:45

## 2017-08-05 RX ADMIN — Medication 975 MILLIGRAM(S): at 06:59

## 2017-08-05 RX ADMIN — OXYCODONE HYDROCHLORIDE 10 MILLIGRAM(S): 5 TABLET ORAL at 13:58

## 2017-08-05 RX ADMIN — Medication 975 MILLIGRAM(S): at 06:03

## 2017-08-05 RX ADMIN — Medication 30 MILLIGRAM(S): at 06:15

## 2017-08-05 NOTE — PROGRESS NOTE ADULT - ASSESSMENT
36F s/p bilateral mastectomies and reconstruction with PAP flaps.  >> Patient doing well. Has progressed well postoperatively. Has met all expectations.  >> Dispo planning

## 2017-08-05 NOTE — PROGRESS NOTE ADULT - SUBJECTIVE AND OBJECTIVE BOX
SUBJECTIVE:  Doing well.   No overnight events.   OOB yesterday.  Tolerating diet.  Pain controlled.    OBJECTIVE:     ** VITAL SIGNS / I&O's **    T(C): 36.5 (08-05-17 @ 06:06), Max: 37.8 (08-04-17 @ 14:51)  T(F): 97.7 (08-05-17 @ 06:06), Max: 100 (08-04-17 @ 14:51)  HR: 97 (08-05-17 @ 06:06) (96 - 98)  BP: 118/81 (08-05-17 @ 06:06) (107/67 - 126/76)  RR: 16 (08-05-17 @ 06:06) (16 - 17)  SpO2: 95% (08-05-17 @ 06:06) (95% - 99%)      04 Aug 2017 07:01  -  05 Aug 2017 07:00  --------------------------------------------------------  IN:    dextrose 5% + sodium chloride 0.45%.: 800 mL    Oral Fluid: 1020 mL  Total IN: 1820 mL    OUT:    Drain: 70 mL    Drain: 62 mL    Drain: 70 mL    Drain: 70 mL    Voided: 3650 mL  Total OUT: 3922 mL    Total NET: -2102 mL          ** PHYSICAL EXAM **     -- CONSTITUTIONAL: AOx3. NAD.   -- RIGHT BREAST / FLAP: Mastectomy skin flap ecchymosis, minimal. NAC viable. No collections. Flap pink with 2-3 second capillary refill. (+) arterial Lulu Doppler signal. Drain serosanguinous.  -- LEFT BREAST / FLAP: Mastectomy skin flap ecchymosis, minimal. NAC viable. No collections. Flap pink with 2-3 second capillary refill. (+) arterial Lulu Doppler signal. Drain serosanguinous.  -- CARDIOVASCULAR: Regular rate and rhythm. S1, S2.  -- RESPIRATORY: Bilateral breath sounds.   -- EXTREMITIES: Soft. No collections. Incision intact. Drains serosanguinous.

## 2017-08-06 RX ORDER — DIAZEPAM 5 MG
1 TABLET ORAL
Qty: 15 | Refills: 0 | OUTPATIENT
Start: 2017-08-06

## 2017-08-07 PROBLEM — D49.3 NEOPLASM OF UNSPECIFIED BEHAVIOR OF BREAST: Chronic | Status: ACTIVE | Noted: 2017-08-01

## 2017-08-08 DIAGNOSIS — C50.812 MALIGNANT NEOPLASM OF OVERLAPPING SITES OF LEFT FEMALE BREAST: ICD-10-CM

## 2017-08-08 DIAGNOSIS — Z40.01 ENCOUNTER FOR PROPHYLACTIC REMOVAL OF BREAST: ICD-10-CM

## 2017-08-10 LAB — SURGICAL PATHOLOGY STUDY: SIGNIFICANT CHANGE UP

## 2017-08-11 ENCOUNTER — APPOINTMENT (OUTPATIENT)
Dept: BREAST CENTER | Facility: CLINIC | Age: 37
End: 2017-08-11
Payer: COMMERCIAL

## 2017-08-11 VITALS
HEART RATE: 95 BPM | DIASTOLIC BLOOD PRESSURE: 77 MMHG | HEIGHT: 62 IN | TEMPERATURE: 98.6 F | BODY MASS INDEX: 22.08 KG/M2 | SYSTOLIC BLOOD PRESSURE: 114 MMHG | WEIGHT: 120 LBS

## 2017-08-11 PROCEDURE — 99024 POSTOP FOLLOW-UP VISIT: CPT

## 2017-08-13 ENCOUNTER — INPATIENT (INPATIENT)
Facility: HOSPITAL | Age: 37
LOS: 5 days | Discharge: HOME | DRG: 863 | End: 2017-08-19
Attending: PLASTIC SURGERY | Admitting: PLASTIC SURGERY
Payer: COMMERCIAL

## 2017-08-13 VITALS
DIASTOLIC BLOOD PRESSURE: 73 MMHG | HEIGHT: 62 IN | WEIGHT: 119.93 LBS | RESPIRATION RATE: 18 BRPM | OXYGEN SATURATION: 99 % | TEMPERATURE: 98 F | HEART RATE: 135 BPM | SYSTOLIC BLOOD PRESSURE: 115 MMHG

## 2017-08-13 DIAGNOSIS — Z98.890 OTHER SPECIFIED POSTPROCEDURAL STATES: Chronic | ICD-10-CM

## 2017-08-13 DIAGNOSIS — Z90.13 ACQUIRED ABSENCE OF BILATERAL BREASTS AND NIPPLES: Chronic | ICD-10-CM

## 2017-08-13 DIAGNOSIS — T81.4XXA INFECTION FOLLOWING A PROCEDURE, INITIAL ENCOUNTER: ICD-10-CM

## 2017-08-13 DIAGNOSIS — R19.8 OTHER SPECIFIED SYMPTOMS AND SIGNS INVOLVING THE DIGESTIVE SYSTEM AND ABDOMEN: Chronic | ICD-10-CM

## 2017-08-13 LAB
ALBUMIN SERPL ELPH-MCNC: 3.2 G/DL — LOW (ref 3.3–5)
ALP SERPL-CCNC: 239 U/L — HIGH (ref 40–120)
ALT FLD-CCNC: 377 U/L — HIGH (ref 10–45)
ANION GAP SERPL CALC-SCNC: 15 MMOL/L — SIGNIFICANT CHANGE UP (ref 5–17)
APTT BLD: 31.3 SEC — SIGNIFICANT CHANGE UP (ref 27.5–37.4)
AST SERPL-CCNC: 186 U/L — HIGH (ref 10–40)
BILIRUB SERPL-MCNC: 0.8 MG/DL — SIGNIFICANT CHANGE UP (ref 0.2–1.2)
BLD GP AB SCN SERPL QL: NEGATIVE — SIGNIFICANT CHANGE UP
BUN SERPL-MCNC: 7 MG/DL — SIGNIFICANT CHANGE UP (ref 7–23)
CALCIUM SERPL-MCNC: 8.6 MG/DL — SIGNIFICANT CHANGE UP (ref 8.4–10.5)
CHLORIDE SERPL-SCNC: 103 MMOL/L — SIGNIFICANT CHANGE UP (ref 96–108)
CO2 SERPL-SCNC: 20 MMOL/L — LOW (ref 22–31)
CREAT SERPL-MCNC: 0.5 MG/DL — SIGNIFICANT CHANGE UP (ref 0.5–1.3)
GLUCOSE SERPL-MCNC: 123 MG/DL — HIGH (ref 70–99)
HCT VFR BLD CALC: 30.4 % — LOW (ref 34.5–45)
HGB BLD-MCNC: 10.2 G/DL — LOW (ref 11.5–15.5)
INR BLD: 1.33 — HIGH (ref 0.88–1.16)
LACTATE SERPL-SCNC: 1.3 MMOL/L — SIGNIFICANT CHANGE UP (ref 0.5–2)
MCHC RBC-ENTMCNC: 31.7 PG — SIGNIFICANT CHANGE UP (ref 27–34)
MCHC RBC-ENTMCNC: 33.6 G/DL — SIGNIFICANT CHANGE UP (ref 32–36)
MCV RBC AUTO: 94.4 FL — SIGNIFICANT CHANGE UP (ref 80–100)
MONOCYTES NFR BLD AUTO: 2 % — SIGNIFICANT CHANGE UP (ref 2–14)
NEUTROPHILS NFR BLD AUTO: 80 % — HIGH (ref 43–77)
PLATELET # BLD AUTO: 432 K/UL — HIGH (ref 150–400)
POTASSIUM SERPL-MCNC: 3.3 MMOL/L — LOW (ref 3.5–5.3)
POTASSIUM SERPL-SCNC: 3.3 MMOL/L — LOW (ref 3.5–5.3)
PROT SERPL-MCNC: 6.5 G/DL — SIGNIFICANT CHANGE UP (ref 6–8.3)
PROTHROM AB SERPL-ACNC: 14.9 SEC — HIGH (ref 9.8–12.7)
RBC # BLD: 3.22 M/UL — LOW (ref 3.8–5.2)
RBC # FLD: 12.8 % — SIGNIFICANT CHANGE UP (ref 10.3–16.9)
RH IG SCN BLD-IMP: POSITIVE — SIGNIFICANT CHANGE UP
SODIUM SERPL-SCNC: 138 MMOL/L — SIGNIFICANT CHANGE UP (ref 135–145)
WBC # BLD: 26.2 K/UL — HIGH (ref 3.8–10.5)
WBC # FLD AUTO: 26.2 K/UL — HIGH (ref 3.8–10.5)

## 2017-08-13 PROCEDURE — 99285 EMERGENCY DEPT VISIT HI MDM: CPT | Mod: 25

## 2017-08-13 PROCEDURE — 93010 ELECTROCARDIOGRAM REPORT: CPT

## 2017-08-13 PROCEDURE — 76641 ULTRASOUND BREAST COMPLETE: CPT | Mod: 26,RT

## 2017-08-13 RX ORDER — AMPICILLIN SODIUM AND SULBACTAM SODIUM 250; 125 MG/ML; MG/ML
3 INJECTION, POWDER, FOR SUSPENSION INTRAMUSCULAR; INTRAVENOUS ONCE
Qty: 0 | Refills: 0 | Status: COMPLETED | OUTPATIENT
Start: 2017-08-13 | End: 2017-08-13

## 2017-08-13 RX ORDER — ASPIRIN/CALCIUM CARB/MAGNESIUM 324 MG
325 TABLET ORAL DAILY
Qty: 0 | Refills: 0 | Status: DISCONTINUED | OUTPATIENT
Start: 2017-08-13 | End: 2017-08-14

## 2017-08-13 RX ORDER — AMPICILLIN SODIUM AND SULBACTAM SODIUM 250; 125 MG/ML; MG/ML
1.5 INJECTION, POWDER, FOR SUSPENSION INTRAMUSCULAR; INTRAVENOUS EVERY 6 HOURS
Qty: 0 | Refills: 0 | Status: DISCONTINUED | OUTPATIENT
Start: 2017-08-13 | End: 2017-08-13

## 2017-08-13 RX ORDER — AMPICILLIN SODIUM AND SULBACTAM SODIUM 250; 125 MG/ML; MG/ML
1.5 INJECTION, POWDER, FOR SUSPENSION INTRAMUSCULAR; INTRAVENOUS ONCE
Qty: 0 | Refills: 0 | Status: DISCONTINUED | OUTPATIENT
Start: 2017-08-13 | End: 2017-08-13

## 2017-08-13 RX ORDER — AMPICILLIN SODIUM AND SULBACTAM SODIUM 250; 125 MG/ML; MG/ML
INJECTION, POWDER, FOR SUSPENSION INTRAMUSCULAR; INTRAVENOUS
Qty: 0 | Refills: 0 | Status: DISCONTINUED | OUTPATIENT
Start: 2017-08-13 | End: 2017-08-13

## 2017-08-13 RX ORDER — SENNA PLUS 8.6 MG/1
2 TABLET ORAL AT BEDTIME
Qty: 0 | Refills: 0 | Status: DISCONTINUED | OUTPATIENT
Start: 2017-08-13 | End: 2017-08-14

## 2017-08-13 RX ORDER — SODIUM CHLORIDE 9 MG/ML
1000 INJECTION INTRAMUSCULAR; INTRAVENOUS; SUBCUTANEOUS ONCE
Qty: 0 | Refills: 0 | Status: COMPLETED | OUTPATIENT
Start: 2017-08-13 | End: 2017-08-13

## 2017-08-13 RX ORDER — DOCUSATE SODIUM 100 MG
100 CAPSULE ORAL THREE TIMES A DAY
Qty: 0 | Refills: 0 | Status: DISCONTINUED | OUTPATIENT
Start: 2017-08-13 | End: 2017-08-14

## 2017-08-13 RX ORDER — VANCOMYCIN HCL 1 G
1000 VIAL (EA) INTRAVENOUS ONCE
Qty: 0 | Refills: 0 | Status: COMPLETED | OUTPATIENT
Start: 2017-08-13 | End: 2017-08-13

## 2017-08-13 RX ORDER — PIPERACILLIN AND TAZOBACTAM 4; .5 G/20ML; G/20ML
4.5 INJECTION, POWDER, LYOPHILIZED, FOR SOLUTION INTRAVENOUS EVERY 6 HOURS
Qty: 0 | Refills: 0 | Status: DISCONTINUED | OUTPATIENT
Start: 2017-08-13 | End: 2017-08-14

## 2017-08-13 RX ORDER — SODIUM CHLORIDE 9 MG/ML
2400 INJECTION, SOLUTION INTRAVENOUS
Qty: 0 | Refills: 0 | Status: DISCONTINUED | OUTPATIENT
Start: 2017-08-13 | End: 2017-08-13

## 2017-08-13 RX ORDER — SODIUM CHLORIDE 9 MG/ML
1000 INJECTION, SOLUTION INTRAVENOUS
Qty: 0 | Refills: 0 | Status: DISCONTINUED | OUTPATIENT
Start: 2017-08-13 | End: 2017-08-14

## 2017-08-13 RX ORDER — VANCOMYCIN HCL 1 G
1000 VIAL (EA) INTRAVENOUS EVERY 12 HOURS
Qty: 0 | Refills: 0 | Status: DISCONTINUED | OUTPATIENT
Start: 2017-08-14 | End: 2017-08-14

## 2017-08-13 RX ORDER — ACETAMINOPHEN 500 MG
650 TABLET ORAL ONCE
Qty: 0 | Refills: 0 | Status: COMPLETED | OUTPATIENT
Start: 2017-08-13 | End: 2017-08-13

## 2017-08-13 RX ORDER — ACETAMINOPHEN 500 MG
800 TABLET ORAL ONCE
Qty: 0 | Refills: 0 | Status: COMPLETED | OUTPATIENT
Start: 2017-08-13 | End: 2017-08-13

## 2017-08-13 RX ORDER — OXYCODONE AND ACETAMINOPHEN 5; 325 MG/1; MG/1
1 TABLET ORAL EVERY 4 HOURS
Qty: 0 | Refills: 0 | Status: DISCONTINUED | OUTPATIENT
Start: 2017-08-13 | End: 2017-08-14

## 2017-08-13 RX ADMIN — Medication 325 MILLIGRAM(S): at 17:58

## 2017-08-13 RX ADMIN — OXYCODONE AND ACETAMINOPHEN 1 TABLET(S): 5; 325 TABLET ORAL at 19:00

## 2017-08-13 RX ADMIN — AMPICILLIN SODIUM AND SULBACTAM SODIUM 200 GRAM(S): 250; 125 INJECTION, POWDER, FOR SUSPENSION INTRAMUSCULAR; INTRAVENOUS at 15:16

## 2017-08-13 RX ADMIN — SODIUM CHLORIDE 2000 MILLILITER(S): 9 INJECTION INTRAMUSCULAR; INTRAVENOUS; SUBCUTANEOUS at 21:57

## 2017-08-13 RX ADMIN — SODIUM CHLORIDE 1000 MILLILITER(S): 9 INJECTION INTRAMUSCULAR; INTRAVENOUS; SUBCUTANEOUS at 15:16

## 2017-08-13 RX ADMIN — Medication 166.67 MILLIGRAM(S): at 20:45

## 2017-08-13 RX ADMIN — OXYCODONE AND ACETAMINOPHEN 1 TABLET(S): 5; 325 TABLET ORAL at 17:56

## 2017-08-13 RX ADMIN — SODIUM CHLORIDE 100 MILLILITER(S): 9 INJECTION, SOLUTION INTRAVENOUS at 23:34

## 2017-08-13 RX ADMIN — Medication 320 MILLIGRAM(S): at 21:55

## 2017-08-13 NOTE — ED ADULT TRIAGE NOTE - CHIEF COMPLAINT QUOTE
Patient came in for rt breast surgical site swelling with fever since yesterday . Had bilateral mastectomy and reconstruction last aug 2 . Patient came in for rt breast surgical site swelling with fever since friday . Had bilateral mastectomy and reconstruction last aug 2 .

## 2017-08-13 NOTE — H&P ADULT - NSHPREVIEWOFSYSTEMS_GEN_ALL_CORE
· CONSTITUTIONAL: - - -  · Constitutional [+]: FEVER  · CARDIOVASCULAR: normal rate and rhythm, no chest pain and no edema.  · RESPIRATORY: no chest pain, no cough, and no shortness of breath.  · GASTROINTESTINAL: no abdominal pain, no bloating, no constipation, no diarrhea, no nausea and no vomiting.

## 2017-08-13 NOTE — ED PROVIDER NOTE - MEDICAL DECISION MAKING DETAILS
37 yo female with recent mastectomy, now with fever, redness, pain in right breast.  will do sepsis armstrong, start iv abx and fluids.  Dr Ignacia Jacobo aware, is sending resident to see pt

## 2017-08-13 NOTE — PROVIDER CONTACT NOTE (CHANGE IN STATUS NOTIFICATION) - ACTION/TREATMENT ORDERED:
night shift nurse Mary Borjas remained with patient to continue care. Ms. CHA CODY was present during RRT episode.

## 2017-08-13 NOTE — PATIENT PROFILE ADULT. - TEACHING/LEARNING LEARNING PREFERENCES
written material/computer/internet/group instruction/video/verbal instruction/individual instruction/skill demonstration/audio/pictorial

## 2017-08-13 NOTE — ED ADULT NURSE NOTE - CHIEF COMPLAINT QUOTE
Patient came in for rt breast surgical site swelling with fever since friday . Had bilateral mastectomy and reconstruction last aug 2 .

## 2017-08-13 NOTE — ED PROVIDER NOTE - OBJECTIVE STATEMENT
fever since last night to 101, pain and redness to right breast  had mastectomy about a week ago Dr Ignacia Jacobo

## 2017-08-13 NOTE — H&P ADULT - NSHPPHYSICALEXAM_GEN_ALL_CORE
· CONSTITUTIONAL: AAAOX3  · Appearance: ILL APPEARING  NAD  · Mentation: awake, alert  · CARDIAC: regular rhythm w/o murmurs  · Cardiac Rate: TACHYCARDIC  · RESPIRATORY: Breath sounds clear and equal bilaterally.  · GASTROINTESTINAL: Abdomen soft, non-tender, no guarding.  · MUSCULOSKELETAL: Spine appears normal, range of motion is not limited, no muscle or joint tenderness  · NEUROLOGICAL: no focal deficits, no motor or sensory deficits.  · SKIN:   rt breast: warm and indurated right lateral breast with redness, warmth, tenderness to palpation  surgical scar in thighs: closed w/o signs of infection bilateraly.  · PSYCHIATRIC: Alert and oriented to person, place, time/situation. normal mood and affect. no apparent risk to self or others.

## 2017-08-13 NOTE — PROVIDER CONTACT NOTE (CHANGE IN STATUS NOTIFICATION) - SITUATION
pt with temp 101.0, , /72. pt c/o "chest palpitation". RRT was called and responded. During RRT- pt's 's, Tmax 101.2. EKG, blood works done, NS 0.9% bolus, Tylenol and Vanco given.

## 2017-08-13 NOTE — PROVIDER CONTACT NOTE (OTHER) - SITUATION
received pt from ER via stretcher, no acute distress, medicated with Percocet for c/o HA, and right breast pain with movements. noted Right breast swelling, soft with redness and tender, bilateral --

## 2017-08-13 NOTE — PROVIDER CONTACT NOTE (OTHER) - SITUATION
arrived to [facility] unit from ER at 17:55 with /78, , O2 sat  100%, RR 17, Temp 100.0. pt was given Percocet 1 tab already for c/o pain. pt had denied chest pain,

## 2017-08-13 NOTE — ED ADULT NURSE NOTE - OBJECTIVE STATEMENT
Pt s/p B/L mastectomy on 8/2/17 referred to ED by her PMD with c/o redness, tenderness and pain to R breast and fevers up to 102F yesterday, relieved w Tylenol. Pt has B/L AMADO drains to thighs, draining serosanguinous fluid. Pt declines analgesia at this time. IV access established, septic work up performed. Pt aware of plan of care and agrees.

## 2017-08-13 NOTE — H&P ADULT - HISTORY OF PRESENT ILLNESS
35 yo F  POD10 following elective bilateral mastectomy and immediate breast reconstruction with PAP flaps. Patient tolerated the procedure well and had uneventful postoperative hospital course.  discharged home on 08/05/17 w drains. reports swelling on the rt lateral breast after drain removal on Friday. swelling continued with increasing pain and redness, eventually PO fever of 102F on saturday night and 101 on sunday morning. after consultation with Dr Lerman, she was admitted to ED. in her labs: WBC 22.6, fever measured 101F. She denies coughing, abdominal pain, dysuria or headaches, denies any trauma to her breast, denies flu like symptoms.

## 2017-08-13 NOTE — PROVIDER CONTACT NOTE (OTHER) - BACKGROUND
breast surgical site intact, bilateral upper posterior thigh surgical site with dermabond C/D/I with AMADO drain at bilateral thigh draining light serousanguinous minimal output.

## 2017-08-14 ENCOUNTER — APPOINTMENT (OUTPATIENT)
Dept: PLASTIC SURGERY | Facility: CLINIC | Age: 37
End: 2017-08-14

## 2017-08-14 LAB
ANION GAP SERPL CALC-SCNC: 14 MMOL/L — SIGNIFICANT CHANGE UP (ref 5–17)
BLD GP AB SCN SERPL QL: NEGATIVE — SIGNIFICANT CHANGE UP
BUN SERPL-MCNC: 6 MG/DL — LOW (ref 7–23)
CALCIUM SERPL-MCNC: 8.5 MG/DL — SIGNIFICANT CHANGE UP (ref 8.4–10.5)
CHLORIDE SERPL-SCNC: 101 MMOL/L — SIGNIFICANT CHANGE UP (ref 96–108)
CO2 SERPL-SCNC: 23 MMOL/L — SIGNIFICANT CHANGE UP (ref 22–31)
CREAT SERPL-MCNC: 0.5 MG/DL — SIGNIFICANT CHANGE UP (ref 0.5–1.3)
GLUCOSE SERPL-MCNC: 115 MG/DL — HIGH (ref 70–99)
GRAM STN FLD: SIGNIFICANT CHANGE UP
HCT VFR BLD CALC: 27.6 % — LOW (ref 34.5–45)
HCT VFR BLD CALC: 28.3 % — LOW (ref 34.5–45)
HGB BLD-MCNC: 9.1 G/DL — LOW (ref 11.5–15.5)
HGB BLD-MCNC: 9.3 G/DL — LOW (ref 11.5–15.5)
MCHC RBC-ENTMCNC: 30.6 PG — SIGNIFICANT CHANGE UP (ref 27–34)
MCHC RBC-ENTMCNC: 30.7 PG — SIGNIFICANT CHANGE UP (ref 27–34)
MCHC RBC-ENTMCNC: 32.9 G/DL — SIGNIFICANT CHANGE UP (ref 32–36)
MCHC RBC-ENTMCNC: 33 G/DL — SIGNIFICANT CHANGE UP (ref 32–36)
MCV RBC AUTO: 93.1 FL — SIGNIFICANT CHANGE UP (ref 80–100)
MCV RBC AUTO: 93.2 FL — SIGNIFICANT CHANGE UP (ref 80–100)
PLATELET # BLD AUTO: 394 K/UL — SIGNIFICANT CHANGE UP (ref 150–400)
PLATELET # BLD AUTO: 410 K/UL — HIGH (ref 150–400)
POTASSIUM SERPL-MCNC: 3.9 MMOL/L — SIGNIFICANT CHANGE UP (ref 3.5–5.3)
POTASSIUM SERPL-SCNC: 3.9 MMOL/L — SIGNIFICANT CHANGE UP (ref 3.5–5.3)
RBC # BLD: 2.96 M/UL — LOW (ref 3.8–5.2)
RBC # BLD: 3.04 M/UL — LOW (ref 3.8–5.2)
RBC # FLD: 13.3 % — SIGNIFICANT CHANGE UP (ref 10.3–16.9)
RBC # FLD: 13.3 % — SIGNIFICANT CHANGE UP (ref 10.3–16.9)
RH IG SCN BLD-IMP: POSITIVE — SIGNIFICANT CHANGE UP
SODIUM SERPL-SCNC: 138 MMOL/L — SIGNIFICANT CHANGE UP (ref 135–145)
SPECIMEN SOURCE: SIGNIFICANT CHANGE UP
TROPONIN T SERPL-MCNC: <0.01 NG/ML — SIGNIFICANT CHANGE UP (ref 0–0.01)
VANCOMYCIN TROUGH SERPL-MCNC: <4 UG/ML — LOW (ref 10–20)
VANCOMYCIN TROUGH SERPL-MCNC: <4 UG/ML — LOW (ref 10–20)
WBC # BLD: 24.4 K/UL — HIGH (ref 3.8–10.5)
WBC # BLD: 27.6 K/UL — HIGH (ref 3.8–10.5)
WBC # FLD AUTO: 24.4 K/UL — HIGH (ref 3.8–10.5)
WBC # FLD AUTO: 27.6 K/UL — HIGH (ref 3.8–10.5)

## 2017-08-14 PROCEDURE — 10180 I&D COMPLEX PO WOUND INFCTJ: CPT | Mod: 78

## 2017-08-14 RX ORDER — OXYCODONE HYDROCHLORIDE 5 MG/1
5 TABLET ORAL EVERY 4 HOURS
Qty: 0 | Refills: 0 | Status: DISCONTINUED | OUTPATIENT
Start: 2017-08-14 | End: 2017-08-19

## 2017-08-14 RX ORDER — ENOXAPARIN SODIUM 100 MG/ML
40 INJECTION SUBCUTANEOUS DAILY
Qty: 0 | Refills: 0 | Status: DISCONTINUED | OUTPATIENT
Start: 2017-08-14 | End: 2017-08-14

## 2017-08-14 RX ORDER — SODIUM CHLORIDE 9 MG/ML
1000 INJECTION, SOLUTION INTRAVENOUS
Qty: 0 | Refills: 0 | Status: DISCONTINUED | OUTPATIENT
Start: 2017-08-14 | End: 2017-08-15

## 2017-08-14 RX ORDER — SENNA PLUS 8.6 MG/1
2 TABLET ORAL AT BEDTIME
Qty: 0 | Refills: 0 | Status: DISCONTINUED | OUTPATIENT
Start: 2017-08-14 | End: 2017-08-19

## 2017-08-14 RX ORDER — KETOROLAC TROMETHAMINE 30 MG/ML
15 SYRINGE (ML) INJECTION EVERY 6 HOURS
Qty: 0 | Refills: 0 | Status: DISCONTINUED | OUTPATIENT
Start: 2017-08-14 | End: 2017-08-17

## 2017-08-14 RX ORDER — DOCUSATE SODIUM 100 MG
100 CAPSULE ORAL THREE TIMES A DAY
Qty: 0 | Refills: 0 | Status: DISCONTINUED | OUTPATIENT
Start: 2017-08-14 | End: 2017-08-19

## 2017-08-14 RX ORDER — ASPIRIN/CALCIUM CARB/MAGNESIUM 324 MG
325 TABLET ORAL DAILY
Qty: 0 | Refills: 0 | Status: DISCONTINUED | OUTPATIENT
Start: 2017-08-14 | End: 2017-08-19

## 2017-08-14 RX ORDER — MORPHINE SULFATE 50 MG/1
2 CAPSULE, EXTENDED RELEASE ORAL EVERY 4 HOURS
Qty: 0 | Refills: 0 | Status: DISCONTINUED | OUTPATIENT
Start: 2017-08-14 | End: 2017-08-14

## 2017-08-14 RX ORDER — PIPERACILLIN AND TAZOBACTAM 4; .5 G/20ML; G/20ML
3.38 INJECTION, POWDER, LYOPHILIZED, FOR SOLUTION INTRAVENOUS EVERY 6 HOURS
Qty: 0 | Refills: 0 | Status: DISCONTINUED | OUTPATIENT
Start: 2017-08-14 | End: 2017-08-14

## 2017-08-14 RX ORDER — KETOROLAC TROMETHAMINE 30 MG/ML
15 SYRINGE (ML) INJECTION EVERY 6 HOURS
Qty: 0 | Refills: 0 | Status: DISCONTINUED | OUTPATIENT
Start: 2017-08-14 | End: 2017-08-14

## 2017-08-14 RX ORDER — ENOXAPARIN SODIUM 100 MG/ML
40 INJECTION SUBCUTANEOUS DAILY
Qty: 0 | Refills: 0 | Status: DISCONTINUED | OUTPATIENT
Start: 2017-08-14 | End: 2017-08-19

## 2017-08-14 RX ORDER — OXYCODONE HYDROCHLORIDE 5 MG/1
5 TABLET ORAL EVERY 4 HOURS
Qty: 0 | Refills: 0 | Status: DISCONTINUED | OUTPATIENT
Start: 2017-08-14 | End: 2017-08-14

## 2017-08-14 RX ORDER — ACETAMINOPHEN 500 MG
650 TABLET ORAL EVERY 6 HOURS
Qty: 0 | Refills: 0 | Status: DISCONTINUED | OUTPATIENT
Start: 2017-08-14 | End: 2017-08-19

## 2017-08-14 RX ORDER — MORPHINE SULFATE 50 MG/1
2 CAPSULE, EXTENDED RELEASE ORAL EVERY 4 HOURS
Qty: 0 | Refills: 0 | Status: DISCONTINUED | OUTPATIENT
Start: 2017-08-14 | End: 2017-08-19

## 2017-08-14 RX ORDER — ACETAMINOPHEN 500 MG
650 TABLET ORAL EVERY 6 HOURS
Qty: 0 | Refills: 0 | Status: DISCONTINUED | OUTPATIENT
Start: 2017-08-14 | End: 2017-08-14

## 2017-08-14 RX ORDER — VANCOMYCIN HCL 1 G
1000 VIAL (EA) INTRAVENOUS EVERY 12 HOURS
Qty: 0 | Refills: 0 | Status: DISCONTINUED | OUTPATIENT
Start: 2017-08-14 | End: 2017-08-15

## 2017-08-14 RX ADMIN — OXYCODONE AND ACETAMINOPHEN 1 TABLET(S): 5; 325 TABLET ORAL at 07:45

## 2017-08-14 RX ADMIN — Medication 100 MILLIGRAM(S): at 21:54

## 2017-08-14 RX ADMIN — PIPERACILLIN AND TAZOBACTAM 200 GRAM(S): 4; .5 INJECTION, POWDER, LYOPHILIZED, FOR SOLUTION INTRAVENOUS at 00:21

## 2017-08-14 RX ADMIN — Medication 100 MILLIGRAM(S): at 00:22

## 2017-08-14 RX ADMIN — Medication 250 MILLIGRAM(S): at 23:13

## 2017-08-14 RX ADMIN — OXYCODONE AND ACETAMINOPHEN 1 TABLET(S): 5; 325 TABLET ORAL at 07:07

## 2017-08-14 RX ADMIN — SODIUM CHLORIDE 100 MILLILITER(S): 9 INJECTION, SOLUTION INTRAVENOUS at 10:16

## 2017-08-14 RX ADMIN — Medication 650 MILLIGRAM(S): at 21:54

## 2017-08-14 RX ADMIN — SODIUM CHLORIDE 125 MILLILITER(S): 9 INJECTION, SOLUTION INTRAVENOUS at 16:18

## 2017-08-14 RX ADMIN — Medication 100 MILLIGRAM(S): at 07:07

## 2017-08-14 RX ADMIN — Medication 15 MILLIGRAM(S): at 23:13

## 2017-08-14 RX ADMIN — PIPERACILLIN AND TAZOBACTAM 200 GRAM(S): 4; .5 INJECTION, POWDER, LYOPHILIZED, FOR SOLUTION INTRAVENOUS at 07:07

## 2017-08-14 RX ADMIN — Medication 15 MILLIGRAM(S): at 23:45

## 2017-08-14 RX ADMIN — MORPHINE SULFATE 2 MILLIGRAM(S): 50 CAPSULE, EXTENDED RELEASE ORAL at 17:29

## 2017-08-14 RX ADMIN — PIPERACILLIN AND TAZOBACTAM 200 GRAM(S): 4; .5 INJECTION, POWDER, LYOPHILIZED, FOR SOLUTION INTRAVENOUS at 21:53

## 2017-08-14 NOTE — DIETITIAN INITIAL EVALUATION ADULT. - OTHER INFO
37yo F POD# 11 s/p b/l breast reconstruction with PAP flaps now with R breast collection s/p bedside drainage. Currently NPO for procedure. Denies N/V/DC, no issues chewing or swallowing. C/o headache. No other pain reported. Pt states having poor appetite and consumed <25% when on regular diet. No wt changes. Discussed increased protein needs 2/2 surgical incisions and rebuilding of tissue. Pt expressed understanding of diet advancement process. NKFA or dietary restrictions. Skin and GI WDL per flowsheet.

## 2017-08-14 NOTE — PROVIDER CONTACT NOTE (OTHER) - ACTION/TREATMENT ORDERED:
Okay by Robinson Morocho MD to use either extremity to take vitals and draw blood.
will continue to monitor
ECG done and blood works done aswell

## 2017-08-14 NOTE — DIETITIAN INITIAL EVALUATION ADULT. - ENERGY NEEDS
Height: 5'2" Weight: 120lbs, IBW 110lbs+/-10%, %%, BMI 21.9  ABW used for calculations as pt between % of IBW.

## 2017-08-14 NOTE — PROVIDER CONTACT NOTE (OTHER) - SITUATION
Pt. complained of chest pressure located on her sternum,heart rate is elevated as high as 125b/m,latest temp 100.8F

## 2017-08-14 NOTE — PROVIDER CONTACT NOTE (OTHER) - ASSESSMENT
A/OX3
pt was oriented to unit, verbalized understanding. family members at bedside. will continue to monitor

## 2017-08-15 LAB
ALBUMIN SERPL ELPH-MCNC: 2.3 G/DL — LOW (ref 3.3–5)
ALP SERPL-CCNC: 192 U/L — HIGH (ref 40–120)
ALT FLD-CCNC: 158 U/L — HIGH (ref 10–45)
ANION GAP SERPL CALC-SCNC: 11 MMOL/L — SIGNIFICANT CHANGE UP (ref 5–17)
AST SERPL-CCNC: 43 U/L — HIGH (ref 10–40)
BILIRUB SERPL-MCNC: 0.4 MG/DL — SIGNIFICANT CHANGE UP (ref 0.2–1.2)
BUN SERPL-MCNC: 6 MG/DL — LOW (ref 7–23)
CALCIUM SERPL-MCNC: 8.4 MG/DL — SIGNIFICANT CHANGE UP (ref 8.4–10.5)
CHLORIDE SERPL-SCNC: 104 MMOL/L — SIGNIFICANT CHANGE UP (ref 96–108)
CO2 SERPL-SCNC: 25 MMOL/L — SIGNIFICANT CHANGE UP (ref 22–31)
CREAT SERPL-MCNC: 0.5 MG/DL — SIGNIFICANT CHANGE UP (ref 0.5–1.3)
GLUCOSE SERPL-MCNC: 111 MG/DL — HIGH (ref 70–99)
GRAM STN FLD: SIGNIFICANT CHANGE UP
GRAM STN FLD: SIGNIFICANT CHANGE UP
HCT VFR BLD CALC: 24.5 % — LOW (ref 34.5–45)
HGB BLD-MCNC: 8 G/DL — LOW (ref 11.5–15.5)
MCHC RBC-ENTMCNC: 30.3 PG — SIGNIFICANT CHANGE UP (ref 27–34)
MCHC RBC-ENTMCNC: 32.7 G/DL — SIGNIFICANT CHANGE UP (ref 32–36)
MCV RBC AUTO: 92.8 FL — SIGNIFICANT CHANGE UP (ref 80–100)
PLATELET # BLD AUTO: 372 K/UL — SIGNIFICANT CHANGE UP (ref 150–400)
POTASSIUM SERPL-MCNC: 3.9 MMOL/L — SIGNIFICANT CHANGE UP (ref 3.5–5.3)
POTASSIUM SERPL-SCNC: 3.9 MMOL/L — SIGNIFICANT CHANGE UP (ref 3.5–5.3)
PROT SERPL-MCNC: 5.3 G/DL — LOW (ref 6–8.3)
RBC # BLD: 2.64 M/UL — LOW (ref 3.8–5.2)
RBC # FLD: 13.2 % — SIGNIFICANT CHANGE UP (ref 10.3–16.9)
SODIUM SERPL-SCNC: 140 MMOL/L — SIGNIFICANT CHANGE UP (ref 135–145)
SPECIMEN SOURCE: SIGNIFICANT CHANGE UP
SPECIMEN SOURCE: SIGNIFICANT CHANGE UP
VANCOMYCIN TROUGH SERPL-MCNC: 5.4 UG/ML — LOW (ref 10–20)
WBC # BLD: 18.8 K/UL — HIGH (ref 3.8–10.5)
WBC # FLD AUTO: 18.8 K/UL — HIGH (ref 3.8–10.5)

## 2017-08-15 PROCEDURE — 93010 ELECTROCARDIOGRAM REPORT: CPT

## 2017-08-15 RX ORDER — VANCOMYCIN HCL 1 G
1250 VIAL (EA) INTRAVENOUS EVERY 12 HOURS
Qty: 0 | Refills: 0 | Status: DISCONTINUED | OUTPATIENT
Start: 2017-08-15 | End: 2017-08-16

## 2017-08-15 RX ADMIN — OXYCODONE HYDROCHLORIDE 5 MILLIGRAM(S): 5 TABLET ORAL at 10:12

## 2017-08-15 RX ADMIN — Medication 15 MILLIGRAM(S): at 12:25

## 2017-08-15 RX ADMIN — OXYCODONE HYDROCHLORIDE 5 MILLIGRAM(S): 5 TABLET ORAL at 20:07

## 2017-08-15 RX ADMIN — Medication 15 MILLIGRAM(S): at 12:10

## 2017-08-15 RX ADMIN — SODIUM CHLORIDE 125 MILLILITER(S): 9 INJECTION, SOLUTION INTRAVENOUS at 04:00

## 2017-08-15 RX ADMIN — OXYCODONE HYDROCHLORIDE 5 MILLIGRAM(S): 5 TABLET ORAL at 09:42

## 2017-08-15 RX ADMIN — Medication 15 MILLIGRAM(S): at 17:38

## 2017-08-15 RX ADMIN — Medication 166.67 MILLIGRAM(S): at 13:56

## 2017-08-15 RX ADMIN — Medication 325 MILLIGRAM(S): at 09:44

## 2017-08-15 RX ADMIN — ENOXAPARIN SODIUM 40 MILLIGRAM(S): 100 INJECTION SUBCUTANEOUS at 09:44

## 2017-08-15 RX ADMIN — SODIUM CHLORIDE 125 MILLILITER(S): 9 INJECTION, SOLUTION INTRAVENOUS at 13:42

## 2017-08-15 RX ADMIN — Medication 15 MILLIGRAM(S): at 06:15

## 2017-08-15 RX ADMIN — Medication 100 MILLIGRAM(S): at 13:40

## 2017-08-15 RX ADMIN — Medication 100 MILLIGRAM(S): at 05:40

## 2017-08-15 RX ADMIN — Medication 15 MILLIGRAM(S): at 05:40

## 2017-08-15 RX ADMIN — Medication 100 MILLIGRAM(S): at 22:42

## 2017-08-15 RX ADMIN — OXYCODONE HYDROCHLORIDE 5 MILLIGRAM(S): 5 TABLET ORAL at 19:37

## 2017-08-15 RX ADMIN — Medication 15 MILLIGRAM(S): at 17:23

## 2017-08-15 NOTE — CHART NOTE - NSCHARTNOTEFT_GEN_A_CORE
Patient seen and examined.    Doing well.  No complaints.  Pain controlled.    Right breast cellulitis, stable, decreased in intensity versus x2 days ago.  TTP decreased but still present posteriorly.  No collections appreciated.  AMADO serosanguinous.   Flap viable.   NAC viable.    Operative Culture = Moderate Staph. aureus ---- sensitivities pending  Vancomycin Trough 5.4 (earlier today; vancomycin dose subsequently increased)    - Culture sensitivities pending  - Continue vancomycin  - Regular diet  - HOB elevated  - Drain care

## 2017-08-16 LAB
-  CEFAZOLIN: SIGNIFICANT CHANGE UP
-  CLINDAMYCIN: SIGNIFICANT CHANGE UP
-  ERYTHROMYCIN: SIGNIFICANT CHANGE UP
-  LINEZOLID: SIGNIFICANT CHANGE UP
-  OXACILLIN: SIGNIFICANT CHANGE UP
-  PENICILLIN: SIGNIFICANT CHANGE UP
-  RIFAMPIN: SIGNIFICANT CHANGE UP
-  TRIMETHOPRIM/SULFAMETHOXAZOLE: SIGNIFICANT CHANGE UP
-  VANCOMYCIN: SIGNIFICANT CHANGE UP
CULTURE RESULTS: SIGNIFICANT CHANGE UP
METHOD TYPE: SIGNIFICANT CHANGE UP
ORGANISM # SPEC MICROSCOPIC CNT: SIGNIFICANT CHANGE UP
SPECIMEN SOURCE: SIGNIFICANT CHANGE UP

## 2017-08-16 PROCEDURE — 99222 1ST HOSP IP/OBS MODERATE 55: CPT | Mod: GC

## 2017-08-16 RX ORDER — ACETAMINOPHEN 500 MG
650 TABLET ORAL EVERY 6 HOURS
Qty: 0 | Refills: 0 | Status: DISCONTINUED | OUTPATIENT
Start: 2017-08-16 | End: 2017-08-19

## 2017-08-16 RX ORDER — NAFCILLIN 10 G/100ML
2 INJECTION, POWDER, FOR SOLUTION INTRAVENOUS EVERY 4 HOURS
Qty: 0 | Refills: 0 | Status: DISCONTINUED | OUTPATIENT
Start: 2017-08-16 | End: 2017-08-19

## 2017-08-16 RX ORDER — NAFCILLIN 10 G/100ML
2 INJECTION, POWDER, FOR SOLUTION INTRAVENOUS ONCE
Qty: 0 | Refills: 0 | Status: COMPLETED | OUTPATIENT
Start: 2017-08-16 | End: 2017-08-16

## 2017-08-16 RX ORDER — PETROLATUM,WHITE
1 JELLY (GRAM) TOPICAL EVERY 12 HOURS
Qty: 0 | Refills: 0 | Status: DISCONTINUED | OUTPATIENT
Start: 2017-08-16 | End: 2017-08-19

## 2017-08-16 RX ORDER — VANCOMYCIN HCL 1 G
1250 VIAL (EA) INTRAVENOUS ONCE
Qty: 0 | Refills: 0 | Status: COMPLETED | OUTPATIENT
Start: 2017-08-16 | End: 2017-08-16

## 2017-08-16 RX ORDER — VANCOMYCIN HCL 1 G
1250 VIAL (EA) INTRAVENOUS EVERY 12 HOURS
Qty: 0 | Refills: 0 | Status: DISCONTINUED | OUTPATIENT
Start: 2017-08-16 | End: 2017-08-16

## 2017-08-16 RX ORDER — ONDANSETRON 8 MG/1
4 TABLET, FILM COATED ORAL EVERY 6 HOURS
Qty: 0 | Refills: 0 | Status: DISCONTINUED | OUTPATIENT
Start: 2017-08-16 | End: 2017-08-19

## 2017-08-16 RX ORDER — NAFCILLIN 10 G/100ML
INJECTION, POWDER, FOR SOLUTION INTRAVENOUS
Qty: 0 | Refills: 0 | Status: DISCONTINUED | OUTPATIENT
Start: 2017-08-16 | End: 2017-08-19

## 2017-08-16 RX ADMIN — Medication 15 MILLIGRAM(S): at 00:21

## 2017-08-16 RX ADMIN — OXYCODONE HYDROCHLORIDE 5 MILLIGRAM(S): 5 TABLET ORAL at 09:30

## 2017-08-16 RX ADMIN — NAFCILLIN 200 GRAM(S): 10 INJECTION, POWDER, FOR SOLUTION INTRAVENOUS at 23:48

## 2017-08-16 RX ADMIN — Medication 15 MILLIGRAM(S): at 23:48

## 2017-08-16 RX ADMIN — Medication 100 MILLIGRAM(S): at 23:48

## 2017-08-16 RX ADMIN — Medication 15 MILLIGRAM(S): at 06:40

## 2017-08-16 RX ADMIN — NAFCILLIN 200 GRAM(S): 10 INJECTION, POWDER, FOR SOLUTION INTRAVENOUS at 19:24

## 2017-08-16 RX ADMIN — OXYCODONE HYDROCHLORIDE 5 MILLIGRAM(S): 5 TABLET ORAL at 00:23

## 2017-08-16 RX ADMIN — ENOXAPARIN SODIUM 40 MILLIGRAM(S): 100 INJECTION SUBCUTANEOUS at 12:45

## 2017-08-16 RX ADMIN — Medication 15 MILLIGRAM(S): at 13:10

## 2017-08-16 RX ADMIN — Medication 650 MILLIGRAM(S): at 13:06

## 2017-08-16 RX ADMIN — Medication 325 MILLIGRAM(S): at 12:45

## 2017-08-16 RX ADMIN — Medication 15 MILLIGRAM(S): at 06:12

## 2017-08-16 RX ADMIN — Medication 100 MILLIGRAM(S): at 13:06

## 2017-08-16 RX ADMIN — OXYCODONE HYDROCHLORIDE 5 MILLIGRAM(S): 5 TABLET ORAL at 01:00

## 2017-08-16 RX ADMIN — ONDANSETRON 4 MILLIGRAM(S): 8 TABLET, FILM COATED ORAL at 12:52

## 2017-08-16 RX ADMIN — Medication 1 APPLICATION(S): at 17:27

## 2017-08-16 RX ADMIN — Medication 166.67 MILLIGRAM(S): at 01:00

## 2017-08-16 RX ADMIN — Medication 100 MILLIGRAM(S): at 06:12

## 2017-08-16 RX ADMIN — Medication 166.67 MILLIGRAM(S): at 14:10

## 2017-08-16 RX ADMIN — Medication 15 MILLIGRAM(S): at 00:50

## 2017-08-16 RX ADMIN — OXYCODONE HYDROCHLORIDE 5 MILLIGRAM(S): 5 TABLET ORAL at 08:46

## 2017-08-16 RX ADMIN — Medication 15 MILLIGRAM(S): at 12:56

## 2017-08-16 NOTE — CONSULT NOTE ADULT - SUBJECTIVE AND OBJECTIVE BOX
Infectious Disease Consult Note    HPI:  35 yo F w/ pmhx of ?BRCA mutation s/p elective bilateral mastectomy and immediate breast reconstruction with PAP flaps from thigh (on 8/3), discharged home on 08/05/17 w/ drains, had drain removal 8/11 followed by pt's reported swelling/redness/pain of right lateral breast as well as fever, presented back to [facility] on 8/13 and admitted for right breast abscess, s/p washout with cultures sent growing staph aureus.    On 8/13, pt received 1 dose of Unasyn 3g, then on 8/14 received 2 doses of Zosyn 3.375g. Pt has been under-dosed on Vancomycin since 8/13 with repeatedly low vanc troughs. Rifampin added today (8/16). ID service consulted for further recs regarding antibiotics.      PAST MEDICAL & SURGICAL HISTORY:  Breast tumor  S/P breast reconstruction, bilateral: PAP free flap  S/P mastectomy, bilateral: nipple sparing  Endoscopy finding    REVIEW OF SYSTEMS: negative except as stated in HPI.    MEDICATIONS  (STANDING):  aspirin 325 milliGRAM(s) Oral daily  docusate sodium 100 milliGRAM(s) Oral three times a day  enoxaparin Injectable 40 milliGRAM(s) SubCutaneous daily  ketorolac   Injectable 15 milliGRAM(s) IV Push every 6 hours  vancomycin  IVPB 1250 milliGRAM(s) IV Intermittent once  vancomycin  IVPB 1250 milliGRAM(s) IV Intermittent every 12 hours  rifampin IVPB   IV Intermittent   rifampin IVPB 300 milliGRAM(s) IV Intermittent once  rifampin IVPB 300 milliGRAM(s) IV Intermittent every 12 hours    MEDICATIONS  (PRN):  senna 2 Tablet(s) Oral at bedtime PRN Constipation  diazepam    Tablet 5 milliGRAM(s) Oral every 6 hours PRN Muscle Spasm  oxyCODONE    IR 5 milliGRAM(s) Oral every 4 hours PRN Moderate Pain (4 - 6)  acetaminophen   Tablet 650 milliGRAM(s) Oral every 6 hours PRN For Temp greater than 38 C (100.4 F)  morphine  - Injectable 2 milliGRAM(s) IV Push every 4 hours PRN breakthrough pain      Allergies    No Known Allergies    Intolerances    FAMILY HISTORY:  No pertinent family history in first degree relatives      Vital Signs Last 24 Hrs  T(C): 36.5 (16 Aug 2017 05:20), Max: 37.1 (16 Aug 2017 00:16)  T(F): 97.7 (16 Aug 2017 05:20), Max: 98.7 (16 Aug 2017 00:16)  HR: 80 (16 Aug 2017 08:46) (80 - 103)  BP: 116/73 (16 Aug 2017 08:46) (107/71 - 122/87)  BP(mean): --  RR: 15 (16 Aug 2017 08:46) (15 - 16)  SpO2: 96% (16 Aug 2017 08:46) (96% - 100%)    PHYSICAL EXAM:        LABS:                        8.0    18.8  )-----------( 372      ( 15 Aug 2017 06:21 )             24.5     08-15    140  |  104  |  6<L>  ----------------------------<  111<H>  3.9   |  25  |  0.50    Ca    8.4      15 Aug 2017 06:21    TPro  5.3<L>  /  Alb  2.3<L>  /  TBili  0.4  /  DBili  x   /  AST  43<H>  /  ALT  158<H>  /  AlkPhos  192<H>  08-15          RADIOLOGY & ADDITIONAL STUDIES: reviewed. Infectious Disease Consult Note    HPI:  37 yo F w/ pmhx of left sided breast cancer (BRCA negative as per patient) s/p elective bilateral mastectomy and immediate breast reconstruction with PAP flaps from thigh (on 8/3), discharged home on 08/05/17 w/ drains, had drain removal 8/11 followed by pt's reported swelling/redness/pain of right lateral breast as well as fever, presented back to [facility] on 8/13 and admitted for right breast abscess, s/p washout with cultures sent growing staph aureus.    On 8/13, pt received 1 dose of Unasyn 3g, then on 8/14 received 2 doses of Zosyn 3.375g. Pt has been under-dosed on Vancomycin since 8/13 with repeatedly low vanc troughs. Rifampin added today (8/16). ID service consulted for further recs regarding antibiotics.    At present, pt c/o slight nausea but denies subjective fever. States that the right breast pain has improved while on analgesic medication. The redness, as per pt, has also improved. Denies cough, dysuria, or swelling/redness/pain at right thigh site.      PAST MEDICAL & SURGICAL HISTORY:  Breast tumor  S/P breast reconstruction, bilateral: PAP free flap  S/P mastectomy, bilateral: nipple sparing  Endoscopy finding    REVIEW OF SYSTEMS: negative except as stated in HPI.    MEDICATIONS  (STANDING):  aspirin 325 milliGRAM(s) Oral daily  docusate sodium 100 milliGRAM(s) Oral three times a day  enoxaparin Injectable 40 milliGRAM(s) SubCutaneous daily  ketorolac   Injectable 15 milliGRAM(s) IV Push every 6 hours  vancomycin  IVPB 1250 milliGRAM(s) IV Intermittent once  vancomycin  IVPB 1250 milliGRAM(s) IV Intermittent every 12 hours  rifampin IVPB   IV Intermittent   rifampin IVPB 300 milliGRAM(s) IV Intermittent once  rifampin IVPB 300 milliGRAM(s) IV Intermittent every 12 hours    MEDICATIONS  (PRN):  senna 2 Tablet(s) Oral at bedtime PRN Constipation  diazepam    Tablet 5 milliGRAM(s) Oral every 6 hours PRN Muscle Spasm  oxyCODONE    IR 5 milliGRAM(s) Oral every 4 hours PRN Moderate Pain (4 - 6)  acetaminophen   Tablet 650 milliGRAM(s) Oral every 6 hours PRN For Temp greater than 38 C (100.4 F)  morphine  - Injectable 2 milliGRAM(s) IV Push every 4 hours PRN breakthrough pain      Allergies    No Known Allergies    Intolerances    FAMILY HISTORY:  No pertinent family history in first degree relatives    SOCIAL HISTORY: never smoker, social etoh use, never illicit drug use.    Vital Signs Last 24 Hrs  T(C): 36.5 (16 Aug 2017 05:20), Max: 37.1 (16 Aug 2017 00:16)  T(F): 97.7 (16 Aug 2017 05:20), Max: 98.7 (16 Aug 2017 00:16)  HR: 80 (16 Aug 2017 08:46) (80 - 103)  BP: 116/73 (16 Aug 2017 08:46) (107/71 - 122/87)  BP(mean): --  RR: 15 (16 Aug 2017 08:46) (15 - 16)  SpO2: 96% (16 Aug 2017 08:46) (96% - 100%)    PHYSICAL EXAM:  GEN: Alert, NAD, comfortable  HEENT: PERRL, EOMI, moist MM, no pharyngeal erythema or exudate  BREAST: Right lateral breast edema/erythema, no tenderness. Incisions C/D/I, AMADO drain inferior to right breast with serosanguinous fluid (no erythema/discharge at drain site)  CV: RRR, S1/S2, no murmurs appreciated  RESP: CTA bilaterally, good respiratory effort, no wheezes/rales  ABD: soft, BS+, nontender, nondistended, no guarding/rebound  EXTREMITIES: WWP, pulses 2+ in all 4 extremities, no peripheral edema  SKIN: warm, dry, intact, see "BREAST" findings above  NEURO: A&Ox3, no focal deficits, strength 5/5 throughout, no sensory deficits  PSYC: normal mood/affect      LABS:                        8.0    18.8  )-----------( 372      ( 15 Aug 2017 06:21 )             24.5     08-15    140  |  104  |  6<L>  ----------------------------<  111<H>  3.9   |  25  |  0.50    Ca    8.4      15 Aug 2017 06:21    TPro  5.3<L>  /  Alb  2.3<L>  /  TBili  0.4  /  DBili  x   /  AST  43<H>  /  ALT  158<H>  /  AlkPhos  192<H>  08-15      RADIOLOGY & ADDITIONAL STUDIES: reviewed.    Microbiology:  Culture - Abscess with Gram Stain (08.14.17 @ 15:15)    Gram Stain:   Few Gram Positive Cocci in Clusters  Moderate White blood cells    Specimen Source: .Abscess left breast    Culture Results:   Moderate Staphylococcus aureus  Susceptibility to follow.    Culture - Body Fluid with Gram Stain (08.14.17 @ 08:53)    Gram Stain:   Numerous Gram Positive Cocci in Clusters  Numerous White blood cells    Specimen Source: .Body Fluid pus from abscess in rt breast    Culture Results:   Numerous Staphylococcus aureus  Susceptibility to follow.    Culture - Surgical Swab (08.14.17 @ 08:43)    Gram Stain:   Few Gram Positive Cocci in Clusters  Numerous White blood cells    Specimen Source: .Surgical Swab pus from abscess in rt breast    Culture Results:   Moderate Staphylococcus aureus  Susceptibility to follow. Infectious Disease Consult Note    HPI:  35 yo F w/ pmhx of left sided breast cancer (BRCA negative as per patient) s/p elective bilateral mastectomy and immediate breast reconstruction with PAP flaps from thigh (on 8/3), discharged home on 08/05/17 w/ drains, had drain removal 8/11 followed by pt's reported swelling/redness/pain of right lateral breast as well as fever, presented back to [facility] on 8/13 and admitted for right breast abscess, s/p washout with cultures sent growing staph aureus.    On 8/13, pt received 1 dose of Unasyn 3g, then on 8/14 received 2 doses of Zosyn 3.375g. Pt has been under-dosed on Vancomycin since 8/13 with repeatedly low vanc troughs. Rifampin added today (8/16). ID service consulted for further recs regarding antibiotics.    At present, pt c/o slight nausea but denies subjective fever. States that the right breast pain has improved while on analgesic medication. The redness, as per pt, has also improved. Denies cough, dysuria, or swelling/redness/pain at right thigh site.      PAST MEDICAL & SURGICAL HISTORY:  Breast tumor  S/P breast reconstruction, bilateral: PAP free flap  S/P mastectomy, bilateral: nipple sparing  Endoscopy finding    REVIEW OF SYSTEMS: negative except as stated in HPI.    MEDICATIONS  (STANDING):  aspirin 325 milliGRAM(s) Oral daily  docusate sodium 100 milliGRAM(s) Oral three times a day  enoxaparin Injectable 40 milliGRAM(s) SubCutaneous daily  ketorolac   Injectable 15 milliGRAM(s) IV Push every 6 hours  vancomycin  IVPB 1250 milliGRAM(s) IV Intermittent once  vancomycin  IVPB 1250 milliGRAM(s) IV Intermittent every 12 hours  rifampin IVPB   IV Intermittent   rifampin IVPB 300 milliGRAM(s) IV Intermittent once  rifampin IVPB 300 milliGRAM(s) IV Intermittent every 12 hours    MEDICATIONS  (PRN):  senna 2 Tablet(s) Oral at bedtime PRN Constipation  diazepam    Tablet 5 milliGRAM(s) Oral every 6 hours PRN Muscle Spasm  oxyCODONE    IR 5 milliGRAM(s) Oral every 4 hours PRN Moderate Pain (4 - 6)  acetaminophen   Tablet 650 milliGRAM(s) Oral every 6 hours PRN For Temp greater than 38 C (100.4 F)  morphine  - Injectable 2 milliGRAM(s) IV Push every 4 hours PRN breakthrough pain      Allergies    No Known Allergies    Intolerances    FAMILY HISTORY:  No pertinent family history in first degree relatives    SOCIAL HISTORY: never smoker, social etoh use, never illicit drug use.    Vital Signs Last 24 Hrs  T(C): 36.5 (16 Aug 2017 05:20), Max: 37.1 (16 Aug 2017 00:16)  T(F): 97.7 (16 Aug 2017 05:20), Max: 98.7 (16 Aug 2017 00:16)  HR: 80 (16 Aug 2017 08:46) (80 - 103)  BP: 116/73 (16 Aug 2017 08:46) (107/71 - 122/87)  BP(mean): --  RR: 15 (16 Aug 2017 08:46) (15 - 16)  SpO2: 96% (16 Aug 2017 08:46) (96% - 100%)    PHYSICAL EXAM:  GEN: Alert, NAD, comfortable  HEENT: PERRL, EOMI, moist MM, no pharyngeal erythema or exudate  BREAST: Right lateral breast erythema/warmth with mild-moderate induration, minimal tenderness. Incisions C/D/I, AMADO drain inferior to right breast with serosanguinous fluid (no erythema/discharge at drain site)  CV: RRR, S1/S2, no murmurs appreciated  RESP: CTA bilaterally, good respiratory effort, no wheezes/rales  ABD: soft, BS+, nontender, nondistended, no guarding/rebound  EXTREMITIES: WWP, pulses 2+ in all 4 extremities, no peripheral edema  SKIN: warm, dry, intact, see "BREAST" findings above  NEURO: A&Ox3, no focal deficits, strength 5/5 throughout, no sensory deficits  PSYC: normal mood/affect      LABS:                        8.0    18.8  )-----------( 372      ( 15 Aug 2017 06:21 )             24.5     08-15    140  |  104  |  6<L>  ----------------------------<  111<H>  3.9   |  25  |  0.50    Ca    8.4      15 Aug 2017 06:21    TPro  5.3<L>  /  Alb  2.3<L>  /  TBili  0.4  /  DBili  x   /  AST  43<H>  /  ALT  158<H>  /  AlkPhos  192<H>  08-15      RADIOLOGY & ADDITIONAL STUDIES: reviewed.  < from: US Breast Complete, Right (08.13.17 @ 16:54) >  Sonographic images demonstrates a 5.6 x 1.5 x 2.9 cm complex fluid   collection with low-level internal echoes (volume of 12.6 mL) in the 9:00   o'clock position, 9 to 10 cm from the nipple. There is a 2.7 x 0.9 x 1.2   cm complex fluid collection 11:00 position, 9 to 10 cm from the nipple   with low level internal echoes. There is diffuse subcutaneous edema   consistent with recent postop.     IMPRESSION:   5.6 cm complex fluid collection with low-level internal echoes 9:00 right   breast, 9-10 cm from the nipple and 2.7 cm complex fluid collection 11:00   position, 9 to 10 cm from the nipple, with low-level internal echoes,   possibly contiguous. Given patient's recent postop, these likely   represent postoperative hematoma/seromas. Given patient's clinical   symptoms of fever, superimposed infection cannot be excluded.   Ultrasound-guided fine-needle aspiration can be performed if clinically   indicated.    Microbiology:  Culture - Abscess with Gram Stain (08.14.17 @ 15:15)    Gram Stain:   Few Gram Positive Cocci in Clusters  Moderate White blood cells    Specimen Source: .Abscess left breast    Culture Results:   Moderate Staphylococcus aureus  Susceptibility to follow.    Culture - Body Fluid with Gram Stain (08.14.17 @ 08:53)    Gram Stain:   Numerous Gram Positive Cocci in Clusters  Numerous White blood cells    Specimen Source: .Body Fluid pus from abscess in rt breast    Culture Results:   Numerous Staphylococcus aureus  Susceptibility to follow.    Culture - Surgical Swab (08.14.17 @ 08:43)    Gram Stain:   Few Gram Positive Cocci in Clusters  Numerous White blood cells    Specimen Source: .Surgical Swab pus from abscess in rt breast    Culture Results:   Moderate Staphylococcus aureus  Susceptibility to follow.

## 2017-08-16 NOTE — CONSULT NOTE ADULT - ATTENDING COMMENTS
Agree with above.  Vanco dose is low.  Can change to 1 gram IV q8hrs and recheck level 30 min prior to 4th dose.  Follow up sensitivities

## 2017-08-16 NOTE — CONSULT NOTE ADULT - ASSESSMENT
Impression: Sepsis (resolving) s/p right breast abscess s/p washout, cultures growing staph aureus.    Recommend:  1. Pt under-dosed on Vancomycin, will likely require adjustment to q8hr dosing.  2. Will update note with attending recs. Impression: Sepsis (resolving) s/p right breast abscess s/p washout, cultures growing staph aureus.    Recommend:  1. Pt under-dosed on Vancomycin, will likely require adjustment to Vancomycin 1g q8hr dosing.  2. Follow up wound culture sensitivities.  3. Will update note with attending recs. Impression: Sepsis (resolving) s/p right breast abscess s/p washout, cultures growing staph aureus.    Recommend:  1. Pt under-dosed on Vancomycin, please change dose to 1g q8hr dosing and check next vanc trough 30 minutes before 4th dose.  2. Please discontinue Rifampin.  3. Follow up wound culture sensitivities. Impression: Sepsis (resolving) s/p right breast abscess s/p washout, cultures growing staph aureus. On exam, concern that breast still may contain fluid.    Recommend:  1. Pt under-dosed on Vancomycin, please change dose to 1g q8hr dosing and check next vanc trough 30 minutes before 4th dose.  2. Please discontinue Rifampin.  3. Follow up wound culture sensitivities.

## 2017-08-17 LAB
HCT VFR BLD CALC: 24.5 % — LOW (ref 34.5–45)
HGB BLD-MCNC: 8.2 G/DL — LOW (ref 11.5–15.5)
MCHC RBC-ENTMCNC: 30.9 PG — SIGNIFICANT CHANGE UP (ref 27–34)
MCHC RBC-ENTMCNC: 33.5 G/DL — SIGNIFICANT CHANGE UP (ref 32–36)
MCV RBC AUTO: 92.5 FL — SIGNIFICANT CHANGE UP (ref 80–100)
PLATELET # BLD AUTO: 458 K/UL — HIGH (ref 150–400)
RBC # BLD: 2.65 M/UL — LOW (ref 3.8–5.2)
RBC # FLD: 13.8 % — SIGNIFICANT CHANGE UP (ref 10.3–16.9)
WBC # BLD: 9.6 K/UL — SIGNIFICANT CHANGE UP (ref 3.8–10.5)
WBC # FLD AUTO: 9.6 K/UL — SIGNIFICANT CHANGE UP (ref 3.8–10.5)

## 2017-08-17 PROCEDURE — 99232 SBSQ HOSP IP/OBS MODERATE 35: CPT

## 2017-08-17 RX ADMIN — Medication 15 MILLIGRAM(S): at 13:35

## 2017-08-17 RX ADMIN — Medication 15 MILLIGRAM(S): at 00:30

## 2017-08-17 RX ADMIN — ENOXAPARIN SODIUM 40 MILLIGRAM(S): 100 INJECTION SUBCUTANEOUS at 12:53

## 2017-08-17 RX ADMIN — Medication 15 MILLIGRAM(S): at 12:53

## 2017-08-17 RX ADMIN — Medication 650 MILLIGRAM(S): at 23:10

## 2017-08-17 RX ADMIN — Medication 325 MILLIGRAM(S): at 12:54

## 2017-08-17 RX ADMIN — NAFCILLIN 200 GRAM(S): 10 INJECTION, POWDER, FOR SOLUTION INTRAVENOUS at 19:35

## 2017-08-17 RX ADMIN — NAFCILLIN 200 GRAM(S): 10 INJECTION, POWDER, FOR SOLUTION INTRAVENOUS at 23:03

## 2017-08-17 RX ADMIN — Medication 15 MILLIGRAM(S): at 07:30

## 2017-08-17 RX ADMIN — Medication 15 MILLIGRAM(S): at 17:58

## 2017-08-17 RX ADMIN — Medication 100 MILLIGRAM(S): at 21:05

## 2017-08-17 RX ADMIN — NAFCILLIN 200 GRAM(S): 10 INJECTION, POWDER, FOR SOLUTION INTRAVENOUS at 11:52

## 2017-08-17 RX ADMIN — Medication 15 MILLIGRAM(S): at 08:26

## 2017-08-17 RX ADMIN — Medication 1 APPLICATION(S): at 17:58

## 2017-08-17 RX ADMIN — Medication 15 MILLIGRAM(S): at 18:32

## 2017-08-17 RX ADMIN — NAFCILLIN 200 GRAM(S): 10 INJECTION, POWDER, FOR SOLUTION INTRAVENOUS at 07:30

## 2017-08-17 RX ADMIN — NAFCILLIN 200 GRAM(S): 10 INJECTION, POWDER, FOR SOLUTION INTRAVENOUS at 16:00

## 2017-08-17 RX ADMIN — Medication 100 MILLIGRAM(S): at 13:24

## 2017-08-17 RX ADMIN — NAFCILLIN 200 GRAM(S): 10 INJECTION, POWDER, FOR SOLUTION INTRAVENOUS at 03:28

## 2017-08-17 RX ADMIN — Medication 1 APPLICATION(S): at 07:30

## 2017-08-17 RX ADMIN — Medication 100 MILLIGRAM(S): at 07:30

## 2017-08-17 NOTE — CHART NOTE - NSCHARTNOTEFT_GEN_A_CORE
Admitting Diagnosis:   Patient is a 36y old  Female who presents with a chief complaint of fever, right breast surgical site swelling, redness, pain (13 Aug 2017 19:51)      PAST MEDICAL & SURGICAL HISTORY:  Breast tumor  S/P breast reconstruction, bilateral: PAP free flap  S/P mastectomy, bilateral: nipple sparing  Endoscopy finding      Current Nutrition Order: Regular       PO Intake: Good (%) [ x ]  Fair (50-75%) [   ] Poor (<25%) [   ]    GI Issues:    Stated having nausea yesterday which has resolved. had BM this morning. Denies any other GI distress    Pain: Pain controlled.    Skin Integrity: Surgical incisions    Labs:   H.H 8.2/24.5, BUN 6, Glu 111, Pro 5.3, Alb 2.3    CAPILLARY BLOOD GLUCOSE      Medications:  MEDICATIONS  (STANDING):  aspirin 325 milliGRAM(s) Oral daily  docusate sodium 100 milliGRAM(s) Oral three times a day  enoxaparin Injectable 40 milliGRAM(s) SubCutaneous daily  ketorolac   Injectable 15 milliGRAM(s) IV Push every 6 hours  AQUAPHOR (petrolatum Ointment) 1 Application(s) Topical every 12 hours  nafcillin  IVPB   IV Intermittent   nafcillin  IVPB 2 Gram(s) IV Intermittent every 4 hours    MEDICATIONS  (PRN):  senna 2 Tablet(s) Oral at bedtime PRN Constipation  diazepam    Tablet 5 milliGRAM(s) Oral every 6 hours PRN Muscle Spasm  oxyCODONE    IR 5 milliGRAM(s) Oral every 4 hours PRN Moderate Pain (4 - 6)  acetaminophen   Tablet 650 milliGRAM(s) Oral every 6 hours PRN For Temp greater than 38 C (100.4 F)  morphine  - Injectable 2 milliGRAM(s) IV Push every 4 hours PRN breakthrough pain  ondansetron Injectable 4 milliGRAM(s) IV Push every 6 hours PRN Nausea and/or Vomiting  acetaminophen   Tablet 650 milliGRAM(s) Oral every 6 hours PRN pain      Weight: 54kG (14-Aug-2017)  Daily     Height: 5'2" Weight: 120lbs, IBW 110lbs+/-10%, %%, BMI 21.9    Estimated energy needs:   1360-1632kcal/day (25-30kcal/kg)  54-65g pro/day (1-1.2g/kg)  1360-1632ml fluid/day (25-30ml/kg)    Subjective:   35yo F s/p elective bilateral mastectomy and immediate breast reconstruction with PAP flaps w infection of the rt reconstructed breast. Currently on reglar diet and tolerating PO. Observed ~75% completion of breakfast at bedside. Reviewed increased nutrient needs 2/2 surgical incisions and rebuilding of tissue. Stated having nausea yesterday which has resolved. had BM this morning. Denies any other GI distress. NKFA or dietary restrictions.    Previous Nutrition Diagnosis:  Inadequate oral intake RT inadequate energy intake 2/2 diet order NPO AEB pt currently meeting 0% estimated needs.    Active [   ]  Resolved [ x ]    If resolved, new PES: No active nutrition diagnosis at this time.    Goal: Pt to continue w/ good intake on regular diet. Consume >75% of estimated energy needs and 100% estimated protein needs.    Recommendations:  Continue on regular diet. Appreciate assistance at meals.    Education: Reviewed increased protein needs. pt is understanding of nutrient needs.    Risk Level: High [   ] Moderate [ x ] Low [   ]

## 2017-08-18 ENCOUNTER — TRANSCRIPTION ENCOUNTER (OUTPATIENT)
Age: 37
End: 2017-08-18

## 2017-08-18 LAB
CULTURE RESULTS: SIGNIFICANT CHANGE UP
SPECIMEN SOURCE: SIGNIFICANT CHANGE UP

## 2017-08-18 PROCEDURE — 99232 SBSQ HOSP IP/OBS MODERATE 35: CPT

## 2017-08-18 RX ORDER — ACETAMINOPHEN 500 MG
2 TABLET ORAL
Qty: 0 | Refills: 0 | COMMUNITY
Start: 2017-08-18

## 2017-08-18 RX ORDER — CEPHALEXIN 500 MG
1 CAPSULE ORAL
Qty: 40 | Refills: 0 | OUTPATIENT
Start: 2017-08-18 | End: 2017-08-28

## 2017-08-18 RX ORDER — OXYCODONE HYDROCHLORIDE 5 MG/1
5 TABLET ORAL ONCE
Qty: 0 | Refills: 0 | Status: DISCONTINUED | OUTPATIENT
Start: 2017-08-18 | End: 2017-08-18

## 2017-08-18 RX ADMIN — NAFCILLIN 200 GRAM(S): 10 INJECTION, POWDER, FOR SOLUTION INTRAVENOUS at 23:33

## 2017-08-18 RX ADMIN — Medication 650 MILLIGRAM(S): at 05:38

## 2017-08-18 RX ADMIN — Medication 325 MILLIGRAM(S): at 11:21

## 2017-08-18 RX ADMIN — NAFCILLIN 200 GRAM(S): 10 INJECTION, POWDER, FOR SOLUTION INTRAVENOUS at 11:19

## 2017-08-18 RX ADMIN — NAFCILLIN 200 GRAM(S): 10 INJECTION, POWDER, FOR SOLUTION INTRAVENOUS at 06:49

## 2017-08-18 RX ADMIN — Medication 1 APPLICATION(S): at 17:37

## 2017-08-18 RX ADMIN — Medication 650 MILLIGRAM(S): at 23:33

## 2017-08-18 RX ADMIN — Medication 1 APPLICATION(S): at 05:38

## 2017-08-18 RX ADMIN — Medication 100 MILLIGRAM(S): at 13:25

## 2017-08-18 RX ADMIN — Medication 100 MILLIGRAM(S): at 05:38

## 2017-08-18 RX ADMIN — NAFCILLIN 200 GRAM(S): 10 INJECTION, POWDER, FOR SOLUTION INTRAVENOUS at 03:03

## 2017-08-18 RX ADMIN — Medication 650 MILLIGRAM(S): at 15:45

## 2017-08-18 RX ADMIN — Medication 100 MILLIGRAM(S): at 23:32

## 2017-08-18 RX ADMIN — NAFCILLIN 200 GRAM(S): 10 INJECTION, POWDER, FOR SOLUTION INTRAVENOUS at 19:17

## 2017-08-18 RX ADMIN — NAFCILLIN 200 GRAM(S): 10 INJECTION, POWDER, FOR SOLUTION INTRAVENOUS at 15:37

## 2017-08-18 RX ADMIN — ENOXAPARIN SODIUM 40 MILLIGRAM(S): 100 INJECTION SUBCUTANEOUS at 11:19

## 2017-08-18 NOTE — DISCHARGE NOTE ADULT - PATIENT PORTAL LINK FT
“You can access the [facility] Patient Portal, offered by [facility], by registering with the following website: [email or web address]”

## 2017-08-18 NOTE — DISCHARGE NOTE ADULT - HOSPITAL COURSE
37 yo F s/p bilateral nipple sparing mastectomies, reconstruction with PAP flaps admitted with breast abscess now s/p ID and washout. Patient tolerated the procedure well, was on IV antibiotics, and cellulitis significantly improved over the hospital course. On the day of the discharge, patient was evaluated and deemed in stable condition to be discharged to home with oral antibiotics. Follow up in one week with Dr. Lerman in the office.

## 2017-08-18 NOTE — DISCHARGE NOTE ADULT - CARE PLAN
Principal Discharge DX:	Cellulitis of breast  Goal:	post op recovery, office follow up  Instructions for follow-up, activity and diet:	-Follow up with Plastic Surgeon, Dr. Lerman in 1 week in the office.     -Continue AMADO drain care as instructed. (Empty and record the AMADO drainage twice daily after discharge. Also, strip/milk the drain tubing each time to minimize clogging. Bring the recorded drain amounts to the office so that it can be reviewed by the physician.)     -Take Tylenol or Percocet as prescribed for pain control.    -Finish the course of antibiotic, Keflex, as prescribed.    -Diet: no restrictions.     -Sponge bath only. Keep the incision site and AMADO drain sites clean and dry at all times.      -No heavy lifting >20 pounds or strenuous exercises.    -Call Doctor’s office or return to ER if: fever (temperature >101.4F), chills, chest pain, shortness of breath, uncontrolled/severe pain, persistent nausea/vomiting, or bleeding/oozing/redness/swelling at incision sites. Principal Discharge DX:	Cellulitis of breast  Goal:	post op recovery, office follow up  Instructions for follow-up, activity and diet:	-Follow up with Plastic Surgeon, Dr. Lerman in 1 week in the office.     -Continue MAADO drain care as instructed. (Empty and record the AMADO drainage twice daily after discharge. Also, strip/milk the drain tubing each time to minimize clogging. Bring the recorded drain amounts to the office so that it can be reviewed by the physician.)     -Take Tylenol or Percocet as prescribed for pain control.    -Finish the course of antibiotic, Keflex, as prescribed.    -Diet: no restrictions.     -Sponge bath only. Keep the incision site and AMADO drain sites clean and dry at all times.      -No heavy lifting >20 pounds or strenuous exercises.    -Call Doctor’s office or return to ER if: fever (temperature >101.4F), chills, chest pain, shortness of breath, uncontrolled/severe pain, persistent nausea/vomiting, or bleeding/oozing/redness/swelling at incision sites.

## 2017-08-18 NOTE — DISCHARGE NOTE ADULT - PLAN OF CARE
post op recovery, office follow up -Follow up with Plastic Surgeon, Dr. Lerman in 1 week in the office.     -Continue AMADO drain care as instructed. (Empty and record the AMADO drainage twice daily after discharge. Also, strip/milk the drain tubing each time to minimize clogging. Bring the recorded drain amounts to the office so that it can be reviewed by the physician.)     -Take Tylenol or Percocet as prescribed for pain control.    -Finish the course of antibiotic, Keflex, as prescribed.    -Diet: no restrictions.     -Sponge bath only. Keep the incision site and AMADO drain sites clean and dry at all times.      -No heavy lifting >20 pounds or strenuous exercises.    -Call Doctor’s office or return to ER if: fever (temperature >101.4F), chills, chest pain, shortness of breath, uncontrolled/severe pain, persistent nausea/vomiting, or bleeding/oozing/redness/swelling at incision sites.

## 2017-08-18 NOTE — DISCHARGE NOTE ADULT - MEDICATION SUMMARY - MEDICATIONS TO TAKE
I will START or STAY ON the medications listed below when I get home from the hospital:    acetaminophen 325 mg oral tablet  -- 2 tab(s) by mouth every 6 hours, As needed, pain  -- Indication: For pain    Keflex 500 mg oral capsule  -- 1 cap(s) by mouth 4 times a day  -- Finish all this medication unless otherwise directed by prescriber.    -- Indication: For Breast abscess    docusate sodium 100 mg oral capsule  -- 1 cap(s) by mouth 3 times a day  -- Indication: For Constipation    senna oral tablet  -- 2 tab(s) by mouth once a day (at bedtime), As needed, Constipation  -- Indication: For Constipation

## 2017-08-19 VITALS
TEMPERATURE: 98 F | DIASTOLIC BLOOD PRESSURE: 75 MMHG | HEART RATE: 84 BPM | RESPIRATION RATE: 16 BRPM | OXYGEN SATURATION: 99 % | SYSTOLIC BLOOD PRESSURE: 112 MMHG

## 2017-08-19 PROCEDURE — 99232 SBSQ HOSP IP/OBS MODERATE 35: CPT

## 2017-08-19 RX ADMIN — Medication 1 APPLICATION(S): at 05:27

## 2017-08-19 RX ADMIN — NAFCILLIN 200 GRAM(S): 10 INJECTION, POWDER, FOR SOLUTION INTRAVENOUS at 02:55

## 2017-08-19 RX ADMIN — NAFCILLIN 200 GRAM(S): 10 INJECTION, POWDER, FOR SOLUTION INTRAVENOUS at 07:10

## 2017-08-19 RX ADMIN — Medication 650 MILLIGRAM(S): at 07:14

## 2017-08-19 RX ADMIN — Medication 100 MILLIGRAM(S): at 05:27

## 2017-08-19 NOTE — PROGRESS NOTE ADULT - SUBJECTIVE AND OBJECTIVE BOX
** PATIENT SEEN AND EXAMINED AT 06:30 **    SUBJECTIVE:  Doing well.   No overnight events.   Feeling better.  No complaints.  Pain controlled.    OBJECTIVE:     ** VITAL SIGNS / I&O's **    T(C): 36.5 (08-16-17 @ 05:20), Max: 37.1 (08-16-17 @ 00:16)  T(F): 97.7 (08-16-17 @ 05:20), Max: 98.7 (08-16-17 @ 00:16)  HR: 97 (08-16-17 @ 12:51) (80 - 103)  BP: 116/80 (08-16-17 @ 12:51) (107/71 - 122/87)  RR: 16 (08-16-17 @ 12:51) (15 - 16)  SpO2: 100% (08-16-17 @ 12:51) (96% - 100%)      15 Aug 2017 07:01  -  16 Aug 2017 07:00  --------------------------------------------------------  IN:    IV PiggyBack: 500 mL    lactated ringers.: 1125 mL    Oral Fluid: 805 mL  Total IN: 2430 mL    OUT:    Drain: 70 mL    Voided: 3950 mL  Total OUT: 4020 mL    Total NET: -1590 mL          ** PHYSICAL EXAM **    -- CONSTITUTIONAL: AOx3. NAD.   -- RIGHT CHEST: Right breast edema and erythema laterally extending towards axilla and back. Mild TTP posteriorly and around drain site. Flap viable. NAC viable. Drain serosanguinous.
Called overnight because patient complains of sternal chest pain.  on arrival which is decreased from her previous vitals. CBC, BMP, Tn unremarkable; EKG reviewed and unchanged from previous by my read. Patient's pain is reproducible on palpation just to right of midline near cellulitis and infection over costochondral junction. She describes it to me as a heavy hand on chest, but denies it feeling like a crushing chest pain. Discussed with Dr. Lerman. Will add Toradol. Not concerned for cardiac origin based on work up and exam. Team to follow in AM.
INTERVAL HPI/OVERNIGHT EVENTS:    Patient was seen and examined at bedside.  Feels well.  Decreased pain and erythema on right breast    CONSTITUTIONAL:  Negative fever or chills, feels well, good appetite  EYES:  Negative  blurry vision or double vision  CARDIOVASCULAR:  Negative for chest pain or palpitations  RESPIRATORY:  Negative for cough, wheezing, or SOB   GASTROINTESTINAL:  Negative for nausea, vomiting, diarrhea, constipation, or abdominal pain  GENITOURINARY:  Negative frequency, urgency or dysuria  NEUROLOGIC:  No headache, confusion, dizziness, lightheadedness      ANTIBIOTICS/RELEVANT:    MEDICATIONS  (STANDING):  aspirin 325 milliGRAM(s) Oral daily  docusate sodium 100 milliGRAM(s) Oral three times a day  enoxaparin Injectable 40 milliGRAM(s) SubCutaneous daily  AQUAPHOR (petrolatum Ointment) 1 Application(s) Topical every 12 hours  nafcillin  IVPB   IV Intermittent   nafcillin  IVPB 2 Gram(s) IV Intermittent every 4 hours    MEDICATIONS  (PRN):  senna 2 Tablet(s) Oral at bedtime PRN Constipation  diazepam    Tablet 5 milliGRAM(s) Oral every 6 hours PRN Muscle Spasm  oxyCODONE    IR 5 milliGRAM(s) Oral every 4 hours PRN Moderate Pain (4 - 6)  acetaminophen   Tablet 650 milliGRAM(s) Oral every 6 hours PRN For Temp greater than 38 C (100.4 F)  morphine  - Injectable 2 milliGRAM(s) IV Push every 4 hours PRN breakthrough pain  ondansetron Injectable 4 milliGRAM(s) IV Push every 6 hours PRN Nausea and/or Vomiting  acetaminophen   Tablet 650 milliGRAM(s) Oral every 6 hours PRN pain        Vital Signs Last 24 Hrs  T(C): 36.4 (19 Aug 2017 06:10), Max: 36.8 (18 Aug 2017 13:45)  T(F): 97.6 (19 Aug 2017 06:10), Max: 98.2 (18 Aug 2017 13:45)  HR: 77 (19 Aug 2017 06:10) (77 - 92)  BP: 112/73 (19 Aug 2017 06:10) (108/72 - 120/81)  BP(mean): --  RR: 16 (19 Aug 2017 06:10) (16 - 17)  SpO2: 99% (19 Aug 2017 06:10) (98% - 100%)    PHYSICAL EXAM:  Constitutional: non-toxic, no distress  Eyes:no icterus  Ear/Nose/Throat: no oral lesion, no sinus tenderness on percussion	  Neck supple  Respiratory: CTA lemuel  breast:  right breast with decreased erythema, drain in place  Cardiovascular: S1S2 RRR, no murmurs  Gastrointestinal:soft, (+) BS,   Extremities:no edema  Vascular: DP Pulse:	right normal; left normal      LABS:      no new labs          MICROBIOLOGY:    RADIOLOGY & ADDITIONAL STUDIES:
INTERVAL HPI/OVERNIGHT EVENTS:    Patient was seen and examined at bedside.  She feels much better today.  Has decreased pain and erythema      CONSTITUTIONAL:  Negative fever or chills, feels well, good appetite  EYES:  Negative  blurry vision or double vision  CARDIOVASCULAR:  Negative for chest pain or palpitations  RESPIRATORY:  Negative for cough, wheezing, or SOB   GASTROINTESTINAL:  Negative for nausea, vomiting, diarrhea, constipation, or abdominal pain  GENITOURINARY:  Negative frequency, urgency or dysuria  NEUROLOGIC:  No headache, confusion, dizziness, lightheadedness  BREAST:  decreased pain and swelling      ANTIBIOTICS/RELEVANT:    MEDICATIONS  (STANDING):  aspirin 325 milliGRAM(s) Oral daily  docusate sodium 100 milliGRAM(s) Oral three times a day  enoxaparin Injectable 40 milliGRAM(s) SubCutaneous daily  ketorolac   Injectable 15 milliGRAM(s) IV Push every 6 hours  AQUAPHOR (petrolatum Ointment) 1 Application(s) Topical every 12 hours  nafcillin  IVPB   IV Intermittent   nafcillin  IVPB 2 Gram(s) IV Intermittent every 4 hours    MEDICATIONS  (PRN):  senna 2 Tablet(s) Oral at bedtime PRN Constipation  diazepam    Tablet 5 milliGRAM(s) Oral every 6 hours PRN Muscle Spasm  oxyCODONE    IR 5 milliGRAM(s) Oral every 4 hours PRN Moderate Pain (4 - 6)  acetaminophen   Tablet 650 milliGRAM(s) Oral every 6 hours PRN For Temp greater than 38 C (100.4 F)  morphine  - Injectable 2 milliGRAM(s) IV Push every 4 hours PRN breakthrough pain  ondansetron Injectable 4 milliGRAM(s) IV Push every 6 hours PRN Nausea and/or Vomiting  acetaminophen   Tablet 650 milliGRAM(s) Oral every 6 hours PRN pain        Vital Signs Last 24 Hrs  T(C): 36.3 (17 Aug 2017 05:18), Max: 37.3 (16 Aug 2017 13:58)  T(F): 97.3 (17 Aug 2017 05:18), Max: 99.2 (16 Aug 2017 13:58)  HR: 74 (17 Aug 2017 05:18) (74 - 97)  BP: 129/71 (17 Aug 2017 05:18) (106/- - 136/74)  BP(mean): --  RR: 16 (17 Aug 2017 05:18) (16 - 18)  SpO2: 96% (17 Aug 2017 05:18) (96% - 100%)    PHYSICAL EXAM:  Constitutional: non-toxic, no distress  Eyes: no icterus  Ear/Nose/Throat: no oral lesion, no sinus tenderness on percussion	  Neck:no JVD, no lymphadenopathy, supple  Respiratory: clear  Cardiovascular: S1S2 RRR, no murmurs  breast:  decreased swelling and erythema, non-tender  Gastrointestinal:soft, (+) BS, no HSM  Extremities:no e/e/c      LABS:                        8.2    9.6   )-----------( 458      ( 17 Aug 2017 07:19 )             24.5                 MICROBIOLOGY:  Culture - Abscess with Gram Stain (08.14.17 @ 15:15)    Gram Stain:   Few Gram Positive Cocci in Clusters  Moderate White blood cells    -  Clindamycin: S <=0.5    -  Erythromycin: S <=0.5    -  RIF- Rifampin: S <=1    -  Trimethoprim/Sulfamethoxazole: S <=0.5/9.5    -  Cefazolin: S <=4    -  Linezolid: S 4    -  Oxacillin: S <=0.25    -  Penicillin: R <=0.03    -  Vancomycin: S 2    Specimen Source: .Abscess left breast    Culture Results:   Moderate Staphylococcus aureus    Organism Identification: Staphylococcus aureus    Organism: Staphylococcus aureus    Method Type: STEVE        RADIOLOGY & ADDITIONAL STUDIES:
INTERVAL HPI/OVERNIGHT EVENTS:    Patient was seen and examined.  Still with some pain around site of the drain but feels much better    CONSTITUTIONAL:  Negative fever or chills, feels well, good appetite  EYES:  Negative  blurry vision or double vision  CARDIOVASCULAR:  Negative for chest pain or palpitations  RESPIRATORY:  Negative for cough, wheezing, or SOB   GASTROINTESTINAL:  Negative for nausea, vomiting, diarrhea, constipation, or abdominal pain  GENITOURINARY:  Negative frequency, urgency or dysuria  NEUROLOGIC:  No headache, confusion, dizziness, lightheadedness      ANTIBIOTICS/RELEVANT:    MEDICATIONS  (STANDING):  aspirin 325 milliGRAM(s) Oral daily  docusate sodium 100 milliGRAM(s) Oral three times a day  enoxaparin Injectable 40 milliGRAM(s) SubCutaneous daily  AQUAPHOR (petrolatum Ointment) 1 Application(s) Topical every 12 hours  nafcillin  IVPB   IV Intermittent   nafcillin  IVPB 2 Gram(s) IV Intermittent every 4 hours    MEDICATIONS  (PRN):  senna 2 Tablet(s) Oral at bedtime PRN Constipation  diazepam    Tablet 5 milliGRAM(s) Oral every 6 hours PRN Muscle Spasm  oxyCODONE    IR 5 milliGRAM(s) Oral every 4 hours PRN Moderate Pain (4 - 6)  acetaminophen   Tablet 650 milliGRAM(s) Oral every 6 hours PRN For Temp greater than 38 C (100.4 F)  morphine  - Injectable 2 milliGRAM(s) IV Push every 4 hours PRN breakthrough pain  ondansetron Injectable 4 milliGRAM(s) IV Push every 6 hours PRN Nausea and/or Vomiting  acetaminophen   Tablet 650 milliGRAM(s) Oral every 6 hours PRN pain        Vital Signs Last 24 Hrs  T(C): 36.3 (18 Aug 2017 05:02), Max: 37.1 (17 Aug 2017 20:56)  T(F): 97.3 (18 Aug 2017 05:02), Max: 98.7 (17 Aug 2017 20:56)  HR: 79 (18 Aug 2017 05:02) (77 - 96)  BP: 111/71 (18 Aug 2017 05:02) (109/74 - 145/77)  BP(mean): --  RR: 15 (18 Aug 2017 05:02) (15 - 17)  SpO2: 99% (18 Aug 2017 05:02) (98% - 100%)    PHYSICAL EXAM:  Constitutional:  non-toxic, no distress  Eyes:no icterus  Ear/Nose/Throat: no oral lesion, no sinus tenderness on percussion	  Neck:no JVD, no lymphadenopathy, supple  Respiratory: CTA lemuel  Cardiovascular: S1S2 RRR, no murmurs  Gastrointestinal:soft, (+) BS,   skin:  right breast with decreased erythema, drain in place.  Non-tender  Extremities:no edema        LABS:                        8.2    9.6   )-----------( 458      ( 17 Aug 2017 07:19 )             24.5                 MICROBIOLOGY:  wound culture:  MSSA    RADIOLOGY & ADDITIONAL STUDIES:
No events overnight. Patient doing well.       AFVSS  AMADO drain 23 cc ss for 24 hrs  R breast with decreasing cellulitis and induration; much improved    A/P:  -plan for home today with drain  -keflex per sensitivities and ID recs  -will follow up with Dr.Lerman
SUBJECTIVE:  Doing well.   Febrile to 100.5 this morning.  C/o R lateral breast discomfort.    OBJECTIVE:     ** VITAL SIGNS / I&O's **    Vital Signs Last 24 Hrs  T(C): 38.1 (14 Aug 2017 06:05), Max: 38.1 (14 Aug 2017 06:05)  T(F): 100.5 (14 Aug 2017 06:05), Max: 100.5 (14 Aug 2017 06:05)  HR: 116 (14 Aug 2017 06:05) (116 - 135)  BP: 116/63 (14 Aug 2017 06:05) (114/63 - 137/72)  BP(mean): --  RR: 16 (14 Aug 2017 06:05) (16 - 18)  SpO2: 98% (14 Aug 2017 06:05) (98% - 100%)      13 Aug 2017 07:01  -  14 Aug 2017 07:00  --------------------------------------------------------  IN:  Total IN: 0 mL    OUT:    Bulb: 15 mL    Bulb: 2.5 mL    Voided: 900 mL  Total OUT: 917.5 mL    Total NET: -917.5 mL          ** PHYSICAL EXAM **    -- CONSTITUTIONAL: AOx3. NAD.   -- HEENT:  -- NECK:   -- CHEST: R breast fullness/erythema laterally extending towards axilla and back, TTP, flap skin paddle viable, L breast soft, no signs of infection, skin paddle viable  -- EXTREMITIES: b/l thigh incision d/c/i, no collection  --JPs serosang      ** LABS **                          9.1    27.6  )-----------( 394      ( 14 Aug 2017 06:09 )             27.6     13 Aug 2017 21:35    138    |  103    |  7      ----------------------------<  123    3.3     |  20     |  0.50     Ca    8.6        13 Aug 2017 21:35    TPro  6.5    /  Alb  3.2    /  TBili  0.8    /  DBili  x      /  AST  186    /  ALT  377    /  AlkPhos  239    13 Aug 2017 21:35    PT/INR - ( 13 Aug 2017 21:32 )   PT: 14.9 sec;   INR: 1.33          PTT - ( 13 Aug 2017 21:32 )  PTT:31.3 sec  CAPILLARY BLOOD GLUCOSE                A/P: POD# 11 s/p b/l breast reconstruction with PAP flaps now with R breast collection s/p bedside drainage             -Persistent leukocytosis and cellulitis, will need OR washout as per Dr. Lerman  -For now cont Vanco/Zosyn, f/u Vanc trough  -NPO/IVF  -pre-op labs  -Pain control  - DVT prophylaxis
SUBJECTIVE:  Doing well.   No overnight events.     OBJECTIVE:     ** VITAL SIGNS / I&O's **    T(C): 36.3 (08-18-17 @ 05:02), Max: 37.1 (08-17-17 @ 20:56)  T(F): 97.3 (08-18-17 @ 05:02), Max: 98.7 (08-17-17 @ 20:56)  HR: 79 (08-18-17 @ 05:02) (77 - 98)  BP: 111/71 (08-18-17 @ 05:02) (109/74 - 145/77)  RR: 15 (08-18-17 @ 05:02) (15 - 17)  SpO2: 99% (08-18-17 @ 05:02) (96% - 100%)      17 Aug 2017 07:01  -  18 Aug 2017 07:00  --------------------------------------------------------  IN:    IV PiggyBack: 500 mL    Oral Fluid: 1040 mL  Total IN: 1540 mL    OUT:    Drain: 20 mL    Voided: 1700 mL  Total OUT: 1720 mL    Total NET: -180 mL          ** PHYSICAL EXAM **    -- CONSTITUTIONAL: AOx3. NAD.   -- CARDIOVASCULAR: Regular rate and rhythm. S1, S2.  -- RESPIRATORY: Bilateral breath sounds.   -- RIGHT BREAST: Decreased intensity and extent of erythema. No TTP. Swelling improved. No collections appreciated. Drain serosanguinous.
SUBJECTIVE:  Doing well.   No overnight events.     OBJECTIVE:     ** VITAL SIGNS / I&O's **    Vital Signs Last 24 Hrs  T(C): 36.5 (15 Aug 2017 05:15), Max: 38.2 (14 Aug 2017 16:30)  T(F): 97.7 (15 Aug 2017 05:15), Max: 100.8 (14 Aug 2017 21:00)  HR: 95 (15 Aug 2017 05:15) (75 - 125)  BP: 112/79 (15 Aug 2017 05:15) (95/51 - 130/72)  BP(mean): 80 (14 Aug 2017 16:30) (77 - 90)  RR: 15 (15 Aug 2017 05:15) (15 - 25)  SpO2: 98% (15 Aug 2017 05:15) (97% - 100%)      14 Aug 2017 07:01  -  15 Aug 2017 07:00  --------------------------------------------------------  IN:    IV PiggyBack: 350 mL    lactated ringers.: 1440 mL    Oral Fluid: 60 mL  Total IN: 1850 mL    OUT:    Drain: 105 mL    Voided: 1850 mL  Total OUT: 1955 mL    Total NET: -105 mL          ** PHYSICAL EXAM **    -- CONSTITUTIONAL: Alert, Awake. NAD.   -- RESPIRATORY: unlabored breathing, no respiratory distress  -- R BREAST: lateral aspect erythema less severe and less tender, +edema, +daniela serosanguinous      ** LABS **                          8.0    18.8  )-----------( 372      ( 15 Aug 2017 06:21 )             24.5     15 Aug 2017 06:21    140    |  104    |  6      ----------------------------<  111    3.9     |  25     |  0.50     Ca    8.4        15 Aug 2017 06:21    TPro  5.3    /  Alb  2.3    /  TBili  0.4    /  DBili  x      /  AST  43     /  ALT  158    /  AlkPhos  192    15 Aug 2017 06:21    PT/INR - ( 13 Aug 2017 21:32 )   PT: 14.9 sec;   INR: 1.33          PTT - ( 13 Aug 2017 21:32 )  PTT:31.3 sec  CAPILLARY BLOOD GLUCOSE        CARDIAC MARKERS ( 14 Aug 2017 21:54 )  x     / <0.01 ng/mL / x     / x     / x
been called by floor nurse that pt is tachycardic 130-140BPM. fever recorded 101.2  rapid response team was called - upon arrival patient is diaphoretic but awake and alert, ice packs were given, IV fluid bollus started (1L salin 0.9%), ABX changed to Vanco 1250mg q12 and Zosyn 4.5gr q8. blood cultures were taken, EKG showed sinus tachycardia. Dr lerman was updated on status. fever went down to 99.5 HR to 135. abscess on the rt lateral breast was drained in a sterile technique, 70cc of bloody pus was extracted, swab and fluid cultures were sent.
SUBJECTIVE  37 yo F , following elective bilateral mastectomy and immediate breast reconstruction with PAP flaps on 08/02.  Doing well.   No overnight events. still reports pain and swelling on the infection site on the right lateral breast.  CUlture growth showed MSSA - ABX switched to Nafcillin.    OBJECTIVE:     ** VITAL SIGNS / I&O's **    T(C): 36.3 (08-17-17 @ 05:18), Max: 37.3 (08-16-17 @ 13:58)  T(F): 97.3 (08-17-17 @ 05:18), Max: 99.2 (08-16-17 @ 13:58)  HR: 74 (08-17-17 @ 05:18) (74 - 97)  BP: 129/71 (08-17-17 @ 05:18) (106/- - 136/74)  RR: 16 (08-17-17 @ 05:18) (16 - 18)  SpO2: 96% (08-17-17 @ 05:18) (96% - 100%)      16 Aug 2017 07:01  -  17 Aug 2017 07:00  --------------------------------------------------------  IN:  Total IN: 0 mL    OUT:    Drain: 31.5 mL    Voided: 1350 mL  Total OUT: 1381.5 mL    Total NET: -1381.5 mL          ** PHYSICAL EXAM **    -- CONSTITUTIONAL: AOx3. NAD.   -- CARDIOVASCULAR: Regular rate and rhythm. S1, S2.  -- RESPIRATORY: Bilateral breath sounds.   -- Breast: slight induration 5X6cm over the rt lat breast skin, drain in place, seranguinous secretion, no collection palpated.  -- ABDOMEN: soft, no organomegaly.    ** LABS **                          8.2    9.6   )-----------( 458      ( 17 Aug 2017 07:19 )             24.5             CAPILLARY BLOOD GLUCOSE

## 2017-08-19 NOTE — PROGRESS NOTE ADULT - ASSESSMENT
37 yo F   following elective bilateral mastectomy and immediate breast reconstruction with PAP flaps w infection of the rt reconstructed breast    DC telemetry  repeat CBC  Drain Care  continue ABX
35 y/o female s/p PAP flaps b/l admitted with right breast abscess now s/p washout  - continue AMADO  - continue abx  - dvt ppx  - vanc trough to be done prior to 2:30 pm dose
36F s/p bilateral nipple sparing mastectomies, reconstruction with PAP flaps c/b right breast abscess s/p I&D, washout  >> Cellulitis improving  >> Continue nafcillin IV for now  >> As per ID, when ready for discharge, will transition to PO Keflex  >> Drain care  >> AMADO teaching  >> HOB elevation  >> Dispo planning
37F s/p bilateral nipple sparing mastectomies and reconstruction with PAP flaps c/b right breast cellulitis and abscess s/p washout and drainage. POD 2.  >> Continue vancomycin  >> Vancomycin trough this evening  >> Rifampin added as per Dr. Lerman  >> ID consulted  >> Culture demonstrates staph. aureus. --- awaiting sensitivities  >> DVT prophylaxis  >> Regular diet  >> HOB elevation  >> Drain care
IMP:  Right breast abscess/cellulitis secondary to MSSA.  Clinically improved with normalization of WBC    RECOMMEND:  1.  Can continue Nafcillin for now while in the hospital  2.  Switch to oral antibiotics (Keflex) on discharge
IMP:  breast abscess secondary to staph aureus    Recommend:  1.  Can continue nafcillin while inpatient.  Switch to PO keflex on discharge.
IMP:  breast cellulitis/abscess secondary to MSSA    Recommend:  1. Can stop nafcillin and switch to keflex 500 mg PO q6hrs.  Can continue that until about 5 days after drain removed.
continue IV ABX  Telemetry  IV fluids 100c/hr

## 2017-08-21 DIAGNOSIS — Z90.10 ACQUIRED ABSENCE OF UNSPECIFIED BREAST AND NIPPLE: ICD-10-CM
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
## 2017-08-21 DIAGNOSIS — D72.829 ELEVATED WHITE BLOOD CELL COUNT, UNSPECIFIED: ICD-10-CM

## 2017-08-21 DIAGNOSIS — N61.1 ABSCESS OF THE BREAST AND NIPPLE: ICD-10-CM

## 2017-08-21 DIAGNOSIS — T81.4XXA INFECTION FOLLOWING A PROCEDURE, INITIAL ENCOUNTER: ICD-10-CM

## 2017-08-21 DIAGNOSIS — Z85.3 PERSONAL HISTORY OF MALIGNANT NEOPLASM OF BREAST: ICD-10-CM

## 2017-08-21 DIAGNOSIS — N64.4 MASTODYNIA: ICD-10-CM

## 2017-08-21 PROCEDURE — 85730 THROMBOPLASTIN TIME PARTIAL: CPT

## 2017-08-21 PROCEDURE — 85025 COMPLETE CBC W/AUTO DIFF WBC: CPT

## 2017-08-21 PROCEDURE — 80202 ASSAY OF VANCOMYCIN: CPT

## 2017-08-21 PROCEDURE — 80053 COMPREHEN METABOLIC PANEL: CPT

## 2017-08-21 PROCEDURE — 80048 BASIC METABOLIC PNL TOTAL CA: CPT

## 2017-08-21 PROCEDURE — 85610 PROTHROMBIN TIME: CPT

## 2017-08-21 PROCEDURE — 87040 BLOOD CULTURE FOR BACTERIA: CPT

## 2017-08-21 PROCEDURE — 93005 ELECTROCARDIOGRAM TRACING: CPT

## 2017-08-21 PROCEDURE — 87205 SMEAR GRAM STAIN: CPT

## 2017-08-21 PROCEDURE — 76641 ULTRASOUND BREAST COMPLETE: CPT

## 2017-08-21 PROCEDURE — 86850 RBC ANTIBODY SCREEN: CPT

## 2017-08-21 PROCEDURE — 84484 ASSAY OF TROPONIN QUANT: CPT

## 2017-08-21 PROCEDURE — 83605 ASSAY OF LACTIC ACID: CPT

## 2017-08-21 PROCEDURE — 99285 EMERGENCY DEPT VISIT HI MDM: CPT | Mod: 25

## 2017-08-21 PROCEDURE — 87070 CULTURE OTHR SPECIMN AEROBIC: CPT

## 2017-08-21 PROCEDURE — 85027 COMPLETE CBC AUTOMATED: CPT

## 2017-08-21 PROCEDURE — 36415 COLL VENOUS BLD VENIPUNCTURE: CPT

## 2017-08-21 PROCEDURE — 87186 SC STD MICRODIL/AGAR DIL: CPT

## 2017-08-21 PROCEDURE — 86901 BLOOD TYPING SEROLOGIC RH(D): CPT

## 2017-08-21 PROCEDURE — 86900 BLOOD TYPING SEROLOGIC ABO: CPT

## 2017-08-21 PROCEDURE — 87075 CULTR BACTERIA EXCEPT BLOOD: CPT

## 2017-08-24 ENCOUNTER — APPOINTMENT (OUTPATIENT)
Dept: PLASTIC SURGERY | Facility: CLINIC | Age: 37
End: 2017-08-24
Payer: COMMERCIAL

## 2017-08-24 PROCEDURE — 99024 POSTOP FOLLOW-UP VISIT: CPT

## 2017-08-31 ENCOUNTER — APPOINTMENT (OUTPATIENT)
Dept: PLASTIC SURGERY | Facility: CLINIC | Age: 37
End: 2017-08-31
Payer: COMMERCIAL

## 2017-08-31 PROCEDURE — 99024 POSTOP FOLLOW-UP VISIT: CPT

## 2017-08-31 RX ORDER — ASPIRIN 325 MG/1
325 TABLET, FILM COATED ORAL
Qty: 10 | Refills: 0 | Status: COMPLETED | COMMUNITY
Start: 2017-08-05

## 2017-08-31 RX ORDER — CEPHALEXIN 500 MG/1
500 CAPSULE ORAL
Qty: 40 | Refills: 0 | Status: COMPLETED | COMMUNITY
Start: 2017-08-18

## 2017-08-31 RX ORDER — DIAZEPAM 5 MG/1
5 TABLET ORAL
Qty: 15 | Refills: 0 | Status: COMPLETED | COMMUNITY
Start: 2017-08-06

## 2017-09-14 ENCOUNTER — APPOINTMENT (OUTPATIENT)
Dept: PLASTIC SURGERY | Facility: CLINIC | Age: 37
End: 2017-09-14
Payer: COMMERCIAL

## 2017-09-14 DIAGNOSIS — C50.912 MALIGNANT NEOPLASM OF UNSPECIFIED SITE OF LEFT FEMALE BREAST: ICD-10-CM

## 2017-09-14 PROCEDURE — 99024 POSTOP FOLLOW-UP VISIT: CPT

## 2017-09-14 RX ORDER — OXYCODONE AND ACETAMINOPHEN 5; 325 MG/1; MG/1
5-325 TABLET ORAL
Qty: 30 | Refills: 0 | Status: COMPLETED | COMMUNITY
Start: 2017-08-05 | End: 2017-09-14

## 2017-09-28 ENCOUNTER — APPOINTMENT (OUTPATIENT)
Dept: PLASTIC SURGERY | Facility: CLINIC | Age: 37
End: 2017-09-28

## 2017-11-16 ENCOUNTER — APPOINTMENT (OUTPATIENT)
Dept: PLASTIC SURGERY | Facility: CLINIC | Age: 37
End: 2017-11-16
Payer: COMMERCIAL

## 2017-11-16 DIAGNOSIS — Z95.828 PRESENCE OF OTHER VASCULAR IMPLANTS AND GRAFTS: ICD-10-CM

## 2017-11-16 PROCEDURE — 99214 OFFICE O/P EST MOD 30 MIN: CPT

## 2017-12-08 ENCOUNTER — APPOINTMENT (OUTPATIENT)
Dept: BREAST CENTER | Facility: CLINIC | Age: 37
End: 2017-12-08

## 2018-03-23 ENCOUNTER — RESULT REVIEW (OUTPATIENT)
Age: 38
End: 2018-03-23

## 2018-03-23 ENCOUNTER — OUTPATIENT (OUTPATIENT)
Dept: OUTPATIENT SERVICES | Facility: HOSPITAL | Age: 38
LOS: 1 days | Discharge: HOME | End: 2018-03-23
Payer: COMMERCIAL

## 2018-03-23 DIAGNOSIS — Z98.890 OTHER SPECIFIED POSTPROCEDURAL STATES: Chronic | ICD-10-CM

## 2018-03-23 DIAGNOSIS — Z90.13 ACQUIRED ABSENCE OF BILATERAL BREASTS AND NIPPLES: Chronic | ICD-10-CM

## 2018-03-23 DIAGNOSIS — R19.8 OTHER SPECIFIED SYMPTOMS AND SIGNS INVOLVING THE DIGESTIVE SYSTEM AND ABDOMEN: Chronic | ICD-10-CM

## 2018-03-23 PROCEDURE — 19380 REVJ RECONSTRUCTED BREAST: CPT

## 2018-03-23 PROCEDURE — 36590 REMOVAL TUNNELED CV CATH: CPT

## 2018-03-23 PROCEDURE — 20926: CPT | Mod: 59

## 2018-03-27 ENCOUNTER — APPOINTMENT (OUTPATIENT)
Dept: PLASTIC SURGERY | Facility: CLINIC | Age: 38
End: 2018-03-27
Payer: COMMERCIAL

## 2018-03-27 VITALS — BODY MASS INDEX: 22.08 KG/M2 | WEIGHT: 120 LBS | HEIGHT: 62 IN

## 2018-03-27 PROCEDURE — 99024 POSTOP FOLLOW-UP VISIT: CPT

## 2018-03-28 LAB
SURGICAL PATHOLOGY STUDY: SIGNIFICANT CHANGE UP
SURGICAL PATHOLOGY STUDY: SIGNIFICANT CHANGE UP

## 2018-04-12 ENCOUNTER — APPOINTMENT (OUTPATIENT)
Dept: PLASTIC SURGERY | Facility: CLINIC | Age: 38
End: 2018-04-12
Payer: COMMERCIAL

## 2018-04-12 PROCEDURE — 99024 POSTOP FOLLOW-UP VISIT: CPT

## 2018-06-25 NOTE — PHYSICAL THERAPY INITIAL EVALUATION ADULT - CRITERIA FOR SKILLED THERAPEUTIC INTERVENTIONS
Contact: Claudia Paredes     Called to confirm patient's appointment with Jessica Galdamez NP. Spoke with Ms. Taylor, patient's mom, who verbally confirmed appointment on 6/26/2018 at 4 pm.    
therapy frequency/anticipated discharge recommendation/impairments found/rehab potential

## 2018-07-28 PROBLEM — Z95.828 PORT-A-CATH IN PLACE: Status: ACTIVE | Noted: 2017-11-16

## 2018-11-27 ENCOUNTER — APPOINTMENT (OUTPATIENT)
Dept: PLASTIC SURGERY | Facility: CLINIC | Age: 38
End: 2018-11-27
Payer: COMMERCIAL

## 2018-11-27 VITALS — BODY MASS INDEX: 22.08 KG/M2 | HEIGHT: 62 IN | WEIGHT: 120 LBS

## 2018-11-27 DIAGNOSIS — L90.5 SCAR CONDITIONS AND FIBROSIS OF SKIN: ICD-10-CM

## 2018-11-27 DIAGNOSIS — N65.1 DISPROPORTION OF RECONSTRUCTED BREAST: ICD-10-CM

## 2018-11-27 PROCEDURE — 99213 OFFICE O/P EST LOW 20 MIN: CPT

## 2018-12-21 ENCOUNTER — APPOINTMENT (OUTPATIENT)
Dept: BREAST CENTER | Facility: CLINIC | Age: 38
End: 2018-12-21
Payer: MEDICAID

## 2018-12-21 VITALS
TEMPERATURE: 98.8 F | WEIGHT: 120 LBS | SYSTOLIC BLOOD PRESSURE: 107 MMHG | HEIGHT: 62 IN | BODY MASS INDEX: 22.08 KG/M2 | HEART RATE: 58 BPM | DIASTOLIC BLOOD PRESSURE: 63 MMHG

## 2018-12-21 DIAGNOSIS — C50.919 MALIGNANT NEOPLASM OF UNSPECIFIED SITE OF UNSPECIFIED FEMALE BREAST: ICD-10-CM

## 2018-12-21 PROCEDURE — 99213 OFFICE O/P EST LOW 20 MIN: CPT

## 2019-02-01 ENCOUNTER — OUTPATIENT (OUTPATIENT)
Dept: OUTPATIENT SERVICES | Facility: HOSPITAL | Age: 39
LOS: 1 days | Discharge: HOME | End: 2019-02-01
Payer: MEDICAID

## 2019-02-01 ENCOUNTER — APPOINTMENT (OUTPATIENT)
Dept: PLASTIC SURGERY | Facility: HOSPITAL | Age: 39
End: 2019-02-01

## 2019-02-01 DIAGNOSIS — R19.8 OTHER SPECIFIED SYMPTOMS AND SIGNS INVOLVING THE DIGESTIVE SYSTEM AND ABDOMEN: Chronic | ICD-10-CM

## 2019-02-01 DIAGNOSIS — Z90.13 ACQUIRED ABSENCE OF BILATERAL BREASTS AND NIPPLES: Chronic | ICD-10-CM

## 2019-02-01 DIAGNOSIS — Z98.890 OTHER SPECIFIED POSTPROCEDURAL STATES: Chronic | ICD-10-CM

## 2019-02-01 PROCEDURE — 19380 REVJ RECONSTRUCTED BREAST: CPT | Mod: 50

## 2019-02-01 PROCEDURE — 11403 EXC TR-EXT B9+MARG 2.1-3CM: CPT | Mod: 59

## 2019-02-01 PROCEDURE — 20926: CPT | Mod: 59

## 2019-02-01 RX ORDER — HYDROCODONE BITARTRATE AND ACETAMINOPHEN 5; 300 MG/1; MG/1
5-300 TABLET ORAL
Qty: 8 | Refills: 0 | Status: ACTIVE | COMMUNITY
Start: 2019-02-01 | End: 1900-01-01

## 2019-02-05 ENCOUNTER — APPOINTMENT (OUTPATIENT)
Dept: PLASTIC SURGERY | Facility: CLINIC | Age: 39
End: 2019-02-05
Payer: MEDICAID

## 2019-02-05 ENCOUNTER — CHART COPY (OUTPATIENT)
Age: 39
End: 2019-02-05

## 2019-02-05 VITALS
BODY MASS INDEX: 22.63 KG/M2 | HEART RATE: 68 BPM | DIASTOLIC BLOOD PRESSURE: 74 MMHG | SYSTOLIC BLOOD PRESSURE: 114 MMHG | HEIGHT: 62 IN | WEIGHT: 123 LBS

## 2019-02-05 PROCEDURE — 99024 POSTOP FOLLOW-UP VISIT: CPT

## 2019-02-05 NOTE — SURGICAL HISTORY
[de-identified] : 8/2/17 - bilateral mastectomy with PAP reconstruction [de-identified] : 8/13/17 - I&D right breast infected seroma [de-identified] : 3/23/18 -BILATERAL BREAST RECONSTRUCTION REVISION/FAT GRAFTING [de-identified] : 2/1/19-REVISION BREAST RECONSTRUCTION WITH FAT GRAFTING FROM THIGHS AND ABDOMEN

## 2019-02-05 NOTE — SURGICAL HISTORY
[de-identified] : 8/2/17 - bilateral mastectomy with PAP reconstruction [de-identified] : 8/13/17 - I&D right breast infected seroma [de-identified] : 3/23/18 - BILATERAL BREAST RECONSTRUCTION REVISION/FAT GRAFTING [de-identified] : 2/5/19 - BILATERAL BREAST RECONSTRUCTION REVISION/FAT GRAFTING

## 2019-02-05 NOTE — ASSESSMENT
[FreeTextEntry1] : PO care reviewed with the patient\par Massage, warm compress, auaphor\par F/U in 1 month\par

## 2019-02-05 NOTE — PHYSICAL EXAM
[de-identified] : breast mounds soft, nipple complexes pink and warm, incision lines C/D/I. still has chest wall concavioty, steppoff deformity of upper pole of the breast and upper outer pole where breast extends into the axilla [de-identified] : Posterior upper thighs scars healed well. but left lateral thigh scar with dog ear and tehetering and redundant skin and adiposity

## 2019-02-05 NOTE — HISTORY OF PRESENT ILLNESS
[FreeTextEntry1] : Pt is 4 days s/p BILATERAL BREAST RECONSTRUCTION REVISION/FAT GRAFTING.  Patient is doing well, pain is well controlled. Pt is very happy with the results\par \par

## 2019-02-05 NOTE — PHYSICAL EXAM
[de-identified] : breast mounds soft, nipple complexes pink and warm, incision lines C/D/I. improved contour and symmetry - no collections or infections  [de-identified] : liposuction donor sites with normal PO ecchymosis, no infection. port site sutures removed

## 2019-02-05 NOTE — HISTORY OF PRESENT ILLNESS
[FreeTextEntry1] : Pt is 4 days s/p revision breast reconstruction with fat grafting from thighs and abdomen. She is doing well. No f/c/n/v/d. \par \par

## 2019-03-05 ENCOUNTER — APPOINTMENT (OUTPATIENT)
Dept: PLASTIC SURGERY | Facility: CLINIC | Age: 39
End: 2019-03-05

## 2019-03-26 ENCOUNTER — APPOINTMENT (OUTPATIENT)
Dept: PLASTIC SURGERY | Facility: CLINIC | Age: 39
End: 2019-03-26
Payer: MEDICAID

## 2019-03-26 DIAGNOSIS — N65.0 DEFORMITY OF RECONSTRUCTED BREAST: ICD-10-CM

## 2019-03-26 PROCEDURE — 99024 POSTOP FOLLOW-UP VISIT: CPT

## 2019-03-26 NOTE — SURGICAL HISTORY
[de-identified] : 8/2/17 - bilateral mastectomy with PAP reconstruction [de-identified] : 8/13/17 - I&D right breast infected seroma [de-identified] : 3/23/18 - BILATERAL BREAST RECONSTRUCTION REVISION/FAT GRAFTING [de-identified] : 2/5/19 - BILATERAL BREAST RECONSTRUCTION REVISION/FAT GRAFTING

## 2019-03-26 NOTE — ASSESSMENT
[FreeTextEntry1] : well healed\par excellent result\par F/U annually starting this Aug/sept\par \par \par

## 2019-03-26 NOTE — HISTORY OF PRESENT ILLNESS
[FreeTextEntry1] : Pt is 6 weeks s/p BILATERAL BREAST RECONSTRUCTION REVISION/FAT GRAFTING.  Patient is doing well and is very happy with the results\par \par

## 2020-01-06 ENCOUNTER — APPOINTMENT (OUTPATIENT)
Dept: BREAST CENTER | Facility: CLINIC | Age: 40
End: 2020-01-06

## 2024-02-01 NOTE — DISCHARGE NOTE ADULT - CARE PROVIDER_API CALL
Lerman, Oren Z (MD), Plastic Surgery  [address]  [address]  [place], [place] [address]  Phone: [phone]  Fax: [phone] Universal Safety Interventions

## 2024-04-12 NOTE — DISCHARGE NOTE ADULT - ADMISSION DATE +STARTOFVISITDATE
Abd pain: no  Anemia: no  Bloating: yes, little bit   Diarrhea: yes, liquid loose stools every 2-3 hours for  last couple years   Constipation: no  Melena: no  Hematochezia:no  Rectal Bleeding:no  Rectal/Anal Pain:no  Pruritus: no  Family history Colon Cancer: no  Previous colon cancer: no  Previous Colon Polyp: yes, x 2 polyps 2013  Change in bowels: yes, loose stools   Decrease caliber of stool: yes, for several years, half the size of pinky finger   Change in color of stool: no, always dark brown in color     Previous work up date: last Colonoscopy 12/13/2013 by Dr. Mills at [facility], findings include; polyp at the sigmoid colon, polyp and slightly irregular tissue near the anorectal junction.            levothyroxine (SYNTHROID) 100 MCG tablet take 1 tablet by mouth every morning BEFORE BREAKFAST      colchicine (COLCRYS) 0.6 MG tablet TAKE 2 TABLETS BY MOUTH NOW, THEN 1 TABLET ONE HOUR LATER, THEN 1 TABLET DAILY THEREAFTER UNTIL RESOLVED      Multiple Vitamins-Minerals (OCUVITE EYE HEALTH FORMULA PO) Take 1 tablet by mouth daily Vitamin C, E, Lutein, Omega- 3      aspirin 81 MG tablet Take 1 tablet by mouth daily       No current facility-administered medications on file prior to visit.     Allergies as of 04/12/2024 - Fully Reviewed 04/12/2024   Allergen Reaction Noted    Atorvastatin Other (See Comments) 11/01/2022         PHYSICAL EXAM:    Blood pressure 122/82, pulse 71, height 1.702 m (5' 7\"), weight 89.2 kg (196 lb 9.6 oz), SpO2 90 %.    Gen:  A and O x 3, NAD, well nourished  Eyes:  Sclera non icterus, PERRL  Head:  Normocephalic, non-tender  Neck:  Supple, no adenopathy, thyroid non tender and no masses,no carotid bruits  Lungs:  CTA, symmetrical  Chest:  RRR, no murmurs  Abd:  Soft, NT, ND, no HSM, no hernias, no bruits  Ext:  No edema, no cyanosis  Psych: reveals appropriate mood, memory and judgment,  Neuro:  Reveals no gross motor or sensory deficits,   Msk:  5/5 strength all 4 extremities, no joint tenderness          ASSESS MENT:    Constipation , has trouble getting stool out.  + occult stools in 2022  3.   Last CS 2013, 2 polyps  4.  No family hx colon cancer  5.  AGE 82    PLAN:    CS   Statement Selected